# Patient Record
Sex: FEMALE | Race: ASIAN | Employment: UNEMPLOYED | URBAN - METROPOLITAN AREA
[De-identification: names, ages, dates, MRNs, and addresses within clinical notes are randomized per-mention and may not be internally consistent; named-entity substitution may affect disease eponyms.]

---

## 2018-05-10 ENCOUNTER — OFFICE VISIT (OUTPATIENT)
Dept: FAMILY MEDICINE CLINIC | Facility: CLINIC | Age: 28
End: 2018-05-10

## 2018-05-10 VITALS
OXYGEN SATURATION: 98 % | HEIGHT: 62.5 IN | RESPIRATION RATE: 18 BRPM | TEMPERATURE: 99 F | WEIGHT: 107 LBS | HEART RATE: 79 BPM | DIASTOLIC BLOOD PRESSURE: 64 MMHG | SYSTOLIC BLOOD PRESSURE: 90 MMHG | BODY MASS INDEX: 19.2 KG/M2

## 2018-05-10 DIAGNOSIS — N63.10 BREAST MASS, RIGHT: Primary | ICD-10-CM

## 2018-05-10 DIAGNOSIS — Z23 NEED FOR VACCINATION: ICD-10-CM

## 2018-05-10 PROCEDURE — 99203 OFFICE O/P NEW LOW 30 MIN: CPT | Performed by: FAMILY MEDICINE

## 2018-05-10 NOTE — PROGRESS NOTES
CHIEF COMPLAINT: Patient presents with:  Establish Care: lumps on right breast    HPI:     Angela Fung is a 28year old female presents for right upper outer quadrant breast mass large become larger over the past 3 years.   Diagnosed 3 years ago in atraumatic. Sclera clear and non icteric bilaterally. Oral pharynx clear without normal finding. Moist mucous membranes. Neck: supple. No adenopathy. No thyromegaly. Heart: RRR without S3 or S4 murmur .  Clear S1S2  Lungs: clear to auscultation bilate changing symptoms. Patient is to call with any side effects or complications from the treatments as a result of today. Problem List:   There is no problem list on file for this patient.       Imaging & Referrals:  None     5/10/2018  Kali Collins, DO

## 2018-05-10 NOTE — PATIENT INSTRUCTIONS
Breast Lump, Uncertain Cause    A lump was found in your breast. Most breast lumps are not cancer. They may be caused by normal changes in the breast tissue due to hormone variations that occur with your menstrual cycle.  Some women may form lumps that ar © 5186-1026 [employer] [address]. 140[address] [facility] [place], [address] [place]. All rights reserved. This information is not intended as a substitute for professional medical care. Always follow your healthcare professional's instructions.         What Ar These benign changes may cause a thickening and firmness in the breasts. Breasts may be tender or painful. They may feel more dense in some areas than in others. Sometimes solid or fluid-filled lumps (cysts) may also form.  Cysts may be smooth, soft or firm © 1215-9841 [employer] [address]. 1407 [facility] [place], [address] [place]. All rights reserved. This information is not intended as a substitute for professional medical care. Always follow your healthcare professional's instructions.         What Ar

## 2018-05-21 ENCOUNTER — HOSPITAL ENCOUNTER (OUTPATIENT)
Dept: MAMMOGRAPHY | Facility: HOSPITAL | Age: 28
Discharge: HOME | End: 2018-05-21
Attending: FAMILY MEDICINE
Payer: COMMERCIAL

## 2018-05-21 DIAGNOSIS — N63.10 BREAST MASS, RIGHT: ICD-10-CM

## 2018-05-21 PROCEDURE — 77062 BREAST TOMOSYNTHESIS BI: CPT | Performed by: FAMILY MEDICINE

## 2018-05-21 PROCEDURE — 76641 ULTRASOUND BREAST COMPLETE: CPT | Performed by: FAMILY MEDICINE

## 2018-05-21 PROCEDURE — 77066 DX MAMMO INCL CAD BI: CPT | Performed by: FAMILY MEDICINE

## 2018-05-21 NOTE — IMAGING NOTE
Assisted Dr. Prosper Mendoza with Recommendation for a 2 site Ultrasound guided breast biopsy. With  present, procedure explained and written information given to [facility]. Emotional support provided and all questions answered.   Our breast cent

## 2018-06-01 ENCOUNTER — HOSPITAL ENCOUNTER (OUTPATIENT)
Dept: MAMMOGRAPHY | Facility: HOSPITAL | Age: 28
Discharge: HOME | End: 2018-06-01
Attending: FAMILY MEDICINE
Payer: COMMERCIAL

## 2018-06-01 DIAGNOSIS — N63.0 BREAST MASS: ICD-10-CM

## 2018-06-01 PROCEDURE — 19084 BX BREAST ADD LESION US IMAG: CPT | Performed by: FAMILY MEDICINE

## 2018-06-01 PROCEDURE — 19083 BX BREAST 1ST LESION US IMAG: CPT | Performed by: FAMILY MEDICINE

## 2018-06-01 PROCEDURE — 88305 TISSUE EXAM BY PATHOLOGIST: CPT | Performed by: FAMILY MEDICINE

## 2018-06-05 ENCOUNTER — TELEPHONE (OUTPATIENT)
Dept: MAMMOGRAPHY | Facility: HOSPITAL | Age: 28
End: 2018-06-05

## 2018-06-06 ENCOUNTER — TELEPHONE (OUTPATIENT)
Dept: MAMMOGRAPHY | Facility: HOSPITAL | Age: 28
End: 2018-06-06

## 2018-06-06 NOTE — TELEPHONE ENCOUNTER
Telephoned Soren Mayly and name,  verified with pt's , Muna Rodriguez. Notified Soren Nicely via spouse of benign right breast US biopsy results.  However, pathologist is recommending site at 11:00 o'clock be removed due to juvenile fibroad

## 2018-06-12 ENCOUNTER — TELEPHONE (OUTPATIENT)
Dept: SURGERY | Facility: CLINIC | Age: 28
End: 2018-06-12

## 2018-06-12 ENCOUNTER — OFFICE VISIT (OUTPATIENT)
Dept: SURGERY | Facility: CLINIC | Age: 28
End: 2018-06-12

## 2018-06-12 VITALS
SYSTOLIC BLOOD PRESSURE: 109 MMHG | HEART RATE: 76 BPM | DIASTOLIC BLOOD PRESSURE: 73 MMHG | OXYGEN SATURATION: 100 % | WEIGHT: 106 LBS | TEMPERATURE: 99 F | HEIGHT: 62.5 IN | BODY MASS INDEX: 19.02 KG/M2

## 2018-06-12 DIAGNOSIS — D24.1 BREAST FIBROADENOMA IN FEMALE, RIGHT: Primary | ICD-10-CM

## 2018-06-12 PROCEDURE — 99244 OFF/OP CNSLTJ NEW/EST MOD 40: CPT | Performed by: SURGERY

## 2018-06-12 NOTE — TELEPHONE ENCOUNTER
S/w Jamil to schedule surgery. Offered several dates. He will call back after discussing dates with patient.  Provided direct call back number

## 2018-06-12 NOTE — PROGRESS NOTES
Breast Surgery New Patient Consultation    This is the first visit for this 28year old woman, referred by Dr. Gopal Dove, who presents for evaluation of right breast masses.     History of Present Illness:   Ms. Jaime Ku is a 28year old woman who outpatient prescriptions have been marked as taking for the 6/12/18 encounter (Office Visit) with Anupam Bush MD.    Allergies:    No Known Allergies    Family History:   Family History   Problem Relation Age of Onset   • No Known Problems Mother pain or vomiting blood.      Genitourinary:  The patient denies frequent urination, needing to get up at night to urinate, urinary hesitancy or retaining urine, painful urination, urinary incontinence, decreased urine stream, blood in the urine or vaginal/p symmetrically. The lungs are clear to auscultation. Heart: The rhythm is regular. There are no murmurs, rubs, gallops or thrills.     Breasts:  Her breasts are symmetrical.  Right breast: The skin, nipple ,and areola appear normal. There is no skin dimp However, fibroadenomas can grown with time and become symptomatic which can prompt surgical excision. Growth of the lesion over time warrants excision to distinguish the lesion from a proliferative Phyllodes tumor.  In light of the fact that the lesions are

## 2018-06-15 ENCOUNTER — OFFICE VISIT (OUTPATIENT)
Dept: FAMILY MEDICINE CLINIC | Facility: CLINIC | Age: 28
End: 2018-06-15

## 2018-06-15 VITALS
HEIGHT: 62.25 IN | HEART RATE: 78 BPM | TEMPERATURE: 98 F | SYSTOLIC BLOOD PRESSURE: 108 MMHG | RESPIRATION RATE: 16 BRPM | BODY MASS INDEX: 19.22 KG/M2 | DIASTOLIC BLOOD PRESSURE: 74 MMHG | OXYGEN SATURATION: 100 % | WEIGHT: 105.81 LBS

## 2018-06-15 DIAGNOSIS — Z13.29 SCREENING FOR ENDOCRINE, NUTRITIONAL, METABOLIC AND IMMUNITY DISORDER: ICD-10-CM

## 2018-06-15 DIAGNOSIS — Z13.0 SCREENING FOR IRON DEFICIENCY ANEMIA: ICD-10-CM

## 2018-06-15 DIAGNOSIS — Z13.0 SCREENING FOR ENDOCRINE, NUTRITIONAL, METABOLIC AND IMMUNITY DISORDER: ICD-10-CM

## 2018-06-15 DIAGNOSIS — Z13.6 SCREENING FOR ISCHEMIC HEART DISEASE: ICD-10-CM

## 2018-06-15 DIAGNOSIS — Z13.228 SCREENING FOR ENDOCRINE, NUTRITIONAL, METABOLIC AND IMMUNITY DISORDER: ICD-10-CM

## 2018-06-15 DIAGNOSIS — Z23 NEED FOR VACCINATION: ICD-10-CM

## 2018-06-15 DIAGNOSIS — Z12.4 CERVICAL CANCER SCREENING: ICD-10-CM

## 2018-06-15 DIAGNOSIS — Z00.00 ANNUAL PHYSICAL EXAM: Primary | ICD-10-CM

## 2018-06-15 DIAGNOSIS — Z13.21 SCREENING FOR ENDOCRINE, NUTRITIONAL, METABOLIC AND IMMUNITY DISORDER: ICD-10-CM

## 2018-06-15 PROCEDURE — 88175 CYTOPATH C/V AUTO FLUID REDO: CPT | Performed by: FAMILY MEDICINE

## 2018-06-15 PROCEDURE — 36415 COLL VENOUS BLD VENIPUNCTURE: CPT | Performed by: FAMILY MEDICINE

## 2018-06-15 PROCEDURE — 90715 TDAP VACCINE 7 YRS/> IM: CPT | Performed by: FAMILY MEDICINE

## 2018-06-15 PROCEDURE — 99395 PREV VISIT EST AGE 18-39: CPT | Performed by: FAMILY MEDICINE

## 2018-06-15 PROCEDURE — 80061 LIPID PANEL: CPT | Performed by: FAMILY MEDICINE

## 2018-06-15 PROCEDURE — 90471 IMMUNIZATION ADMIN: CPT | Performed by: FAMILY MEDICINE

## 2018-06-15 PROCEDURE — 80050 GENERAL HEALTH PANEL: CPT | Performed by: FAMILY MEDICINE

## 2018-06-15 NOTE — PROGRESS NOTES
Patient came in for fasting labs. Patient first draw attempt at right [facility] unsuccessful. Second attempt at left [facility]. Green and lavender tube drawn.

## 2018-06-15 NOTE — PATIENT INSTRUCTIONS
Perform labs fasting 8 hours with water or black coffee or or black tea diet  soda only prior to exam.    -Encourage healthy diet of whole food and avoid processed food and sugary drinks and sodas.   Diet should include lean meats and vegetables including 5 Nonfat milk   302 mg/1 cup   [place], sockeye, canned, with bones   239 mg/3 oz.   Tofu made with calcium sulfate   204 mg/3 oz.    Low-fat milk   297 mg/1 cup   Soybeans, fresh, boiled   131 mg/1/2 cup   Collards   179 mg/1/2 cup   Swiss cheese   272 mg/1 o · Certain medicines, such as cortisone, increase bone loss. They also decrease bone growth. Ask your healthcare provider about any side effects of your medicines, and how to prevent them.   · Protein-rich or salty foods eaten in large amounts may deplete ca Diabetes mellitus, type 2 Adults with no symptoms who are overweight or obese and have 1 or more other risk factors for diabetes At least every 3 years.  Also, testing for diabetes during pregnancy after the 24th week.    Gonorrhea Sexually active women at Measles, mumps, rubella (MMR) All women in this age group who have no record of these infections or vaccines 1 or 2 doses   Meningococcal Women at increased risk for infection should talk with their healthcare provider 1 or more doses   Pneumococcal conjug Date Last Reviewed: 10/1/2017  © 9939-7936 [employer] [address]. 1407 [facility] [place], [address] [place]. All rights reserved. This information is not intended as a substitute for professional medical care.  Always follow your healthcare professional · Weakness or falls    What other tests might I have along with this test?  A healthcare provider may also want to check your parathyroid hormone levels and your calcium levels.   What do my test results mean?   A result for a lab test may be affected by ma Tell your healthcare provider if you take vitamin D supplements. Also be sure your provider knows about all medicines, herbs, vitamins, and supplements you are taking.  This includes medicines that don't need a prescription and any illicit drugs you may use Exercise on most days. Aim for a total of 150 or more minutes of moderate to  vigorous intensity activity each week. Consider 40 minutes, 3 to 4 times a week. For best results, activity should last for 40 minutes on average.  It is OK to work up to the 36 m

## 2018-06-20 ENCOUNTER — TELEPHONE (OUTPATIENT)
Dept: FAMILY MEDICINE CLINIC | Facility: CLINIC | Age: 28
End: 2018-06-20

## 2018-06-20 NOTE — TELEPHONE ENCOUNTER
LMOM to return call to the office. Provided pt office phone [phone] along with office hours given.     Pt needs to schedule follow up    Notes recorded by Merle Frazier DO on 6/18/2018 at 10:55 PM CDT  Moderate anemia.  Needs OV to discuss results

## 2018-06-29 NOTE — TELEPHONE ENCOUNTER
Spoke with pt, scheduled appointment    Future Appointments  Date Time Provider Xena Damon   7/13/2018 1:00 PM Cj Redmond, DO [facility] [place]

## 2018-07-13 ENCOUNTER — OFFICE VISIT (OUTPATIENT)
Dept: FAMILY MEDICINE CLINIC | Facility: CLINIC | Age: 28
End: 2018-07-13

## 2018-07-13 VITALS
DIASTOLIC BLOOD PRESSURE: 70 MMHG | OXYGEN SATURATION: 97 % | BODY MASS INDEX: 19 KG/M2 | HEART RATE: 74 BPM | TEMPERATURE: 98 F | RESPIRATION RATE: 18 BRPM | WEIGHT: 107.25 LBS | SYSTOLIC BLOOD PRESSURE: 98 MMHG

## 2018-07-13 DIAGNOSIS — D50.8 IRON DEFICIENCY ANEMIA SECONDARY TO INADEQUATE DIETARY IRON INTAKE: Primary | ICD-10-CM

## 2018-07-13 PROCEDURE — 99213 OFFICE O/P EST LOW 20 MIN: CPT | Performed by: FAMILY MEDICINE

## 2018-07-13 RX ORDER — PNV NO.95/FERROUS FUM/FOLIC AC 28MG-0.8MG
1 TABLET ORAL 2 TIMES DAILY
Qty: 60 TABLET | Refills: 3 | Status: SHIPPED | OUTPATIENT
Start: 2018-07-13 | End: 2018-08-09

## 2018-07-13 RX ORDER — ASPIRIN 81 MG
100 TABLET, DELAYED RELEASE (ENTERIC COATED) ORAL 2 TIMES DAILY PRN
Qty: 60 TABLET | Refills: 0 | Status: SHIPPED | OUTPATIENT
Start: 2018-07-13 | End: 2018-08-09

## 2018-07-13 NOTE — PROGRESS NOTES
CHIEF COMPLAINT: Patient presents with: Follow - Up: lab results    HPI:     Anamika Lake is a 28year old female presents for discuss labs. History of anemia when pregnant 4 years ago. Took iron.   Denies any chest pain, dizziness, lightheadednes Pulse 74   Temp 98.2 °F (36.8 °C) (Oral)   Resp 18   Wt 107 lb 4 oz   LMP 07/05/2018 (Exact Date)   SpO2 97%   BMI 19.46 kg/m²   Vital signs reviewed. Appears stated age, well groomed. Physical Exam  General: Well-nourished, well hydrated.  No acute distres Neutrophil Absolute Prel* 06/15/2018 3.36    • Neutrophil Absolute 06/15/2018 3.36    • Lymphocyte Absolute 06/15/2018 1.59    • Monocyte Absolute 06/15/2018 0.33    • Eosinophil Absolute 06/15/2018 0.17    • Basophil Absolute 06/15/2018 0.06    • Immature First Screen:          Dee Eastman                                                     Specimen:     ThinPrep Imager Screening Pap, Endocervix                                                 Hospit sulfate 1 tablet daily for 2-3 weeks then twice daily. Increase fiber in diet drinks 64 ounces of water daily to help prevent constipation.   If intolerant of iron, consider referral for iron infusions.  - FERRITIN; Future-this week  - Ferrous Sulfate (IRO

## 2018-07-13 NOTE — PATIENT INSTRUCTIONS
· Start over-the-counter iron sulfate 325 mg 1 tablet by mouth ×3 weeks then increase to twice daily. With an orange only once a day at least 8 ounces of water to help absorption.   · If you experience constipation then buy over-the-counter Colace/docusa Follow up with your healthcare provider in 2 months, or as advised. This is to have another red blood cell count to be sure your anemia has been fixed.   When to seek medical advice  Call your healthcare provider right away if any of these occur:  · Sunshine

## 2018-07-20 ENCOUNTER — TELEPHONE (OUTPATIENT)
Dept: SURGERY | Facility: CLINIC | Age: 28
End: 2018-07-20

## 2018-07-20 NOTE — TELEPHONE ENCOUNTER
Patient's  calling back to schedule surgery. Offered several dates at both locations.  Patient chose 8/14 at [facility].

## 2018-08-06 ENCOUNTER — APPOINTMENT (OUTPATIENT)
Dept: LAB | Facility: HOSPITAL | Age: 28
End: 2018-08-06
Attending: FAMILY MEDICINE
Payer: COMMERCIAL

## 2018-08-06 DIAGNOSIS — D50.8 IRON DEFICIENCY ANEMIA SECONDARY TO INADEQUATE DIETARY IRON INTAKE: ICD-10-CM

## 2018-08-06 LAB
DEPRECATED HBV CORE AB SER IA-ACNC: 2.4 NG/ML (ref 12–114)
IRON SATURATION: 2 % (ref 15–50)
IRON: 13 UG/DL (ref 28–170)
TOTAL IRON BINDING CAPACITY: 618 UG/DL (ref 240–450)
TRANSFERRIN SERPL-MCNC: 415 MG/DL (ref 200–360)

## 2018-08-06 PROCEDURE — 82728 ASSAY OF FERRITIN: CPT

## 2018-08-06 PROCEDURE — 83540 ASSAY OF IRON: CPT

## 2018-08-06 PROCEDURE — 83550 IRON BINDING TEST: CPT

## 2018-08-06 PROCEDURE — 36415 COLL VENOUS BLD VENIPUNCTURE: CPT

## 2018-08-09 RX ORDER — ACETAMINOPHEN 500 MG
1000 TABLET ORAL ONCE
Status: CANCELLED | OUTPATIENT
Start: 2018-08-09 | End: 2018-08-09

## 2018-08-09 RX ORDER — HEPARIN SODIUM 5000 [USP'U]/ML
5000 INJECTION, SOLUTION INTRAVENOUS; SUBCUTANEOUS ONCE
Status: CANCELLED | OUTPATIENT
Start: 2018-08-09 | End: 2018-08-09

## 2018-08-10 ENCOUNTER — TELEPHONE (OUTPATIENT)
Dept: FAMILY MEDICINE CLINIC | Facility: CLINIC | Age: 28
End: 2018-08-10

## 2018-08-10 NOTE — TELEPHONE ENCOUNTER
LMOM to return call to the office. Provided pt office phone [phone] along with office hours given.     Notes recorded by Topher Wilkinson DO on 8/7/2018 at 9:09 PM CDT  Iron deficiency anemia.  Patient to continue iron supplementation. Rut Stewart is not anupam

## 2018-08-13 NOTE — TELEPHONE ENCOUNTER
Spoke with pt, reviewed results and recommendations, pt verbalized understanding, she has upcoming appointment with Dr Gonzalo Benites

## 2018-08-14 ENCOUNTER — HOSPITAL ENCOUNTER (OUTPATIENT)
Facility: HOSPITAL | Age: 28
Setting detail: HOSPITAL OUTPATIENT SURGERY
Discharge: HOME | End: 2018-08-14
Attending: SURGERY | Admitting: SURGERY
Payer: COMMERCIAL

## 2018-08-14 ENCOUNTER — SURGERY (OUTPATIENT)
Age: 28
End: 2018-08-14

## 2018-08-14 ENCOUNTER — ANESTHESIA EVENT (OUTPATIENT)
Dept: SURGERY | Facility: HOSPITAL | Age: 28
End: 2018-08-14
Payer: COMMERCIAL

## 2018-08-14 ENCOUNTER — ANESTHESIA (OUTPATIENT)
Dept: SURGERY | Facility: HOSPITAL | Age: 28
End: 2018-08-14
Payer: COMMERCIAL

## 2018-08-14 VITALS
DIASTOLIC BLOOD PRESSURE: 67 MMHG | SYSTOLIC BLOOD PRESSURE: 107 MMHG | WEIGHT: 105.81 LBS | HEART RATE: 72 BPM | RESPIRATION RATE: 18 BRPM | BODY MASS INDEX: 19.22 KG/M2 | HEIGHT: 62.25 IN | OXYGEN SATURATION: 100 % | TEMPERATURE: 98 F

## 2018-08-14 DIAGNOSIS — N63.10 BREAST MASS, RIGHT: ICD-10-CM

## 2018-08-14 LAB
EXPIRATION DATE: NORMAL
POCT LOT NUMBER: NORMAL
POCT URINE PREGNANCY: NEGATIVE

## 2018-08-14 PROCEDURE — 0JB60ZZ EXCISION OF CHEST SUBCUTANEOUS TISSUE AND FASCIA, OPEN APPROACH: ICD-10-PCS | Performed by: SURGERY

## 2018-08-14 PROCEDURE — 81025 URINE PREGNANCY TEST: CPT | Performed by: SURGERY

## 2018-08-14 PROCEDURE — 88305 TISSUE EXAM BY PATHOLOGIST: CPT | Performed by: SURGERY

## 2018-08-14 RX ORDER — MORPHINE SULFATE 4 MG/ML
2 INJECTION, SOLUTION INTRAMUSCULAR; INTRAVENOUS EVERY 5 MIN PRN
Status: DISCONTINUED | OUTPATIENT
Start: 2018-08-14 | End: 2018-08-14

## 2018-08-14 RX ORDER — BUPIVACAINE HYDROCHLORIDE 5 MG/ML
INJECTION, SOLUTION EPIDURAL; INTRACAUDAL AS NEEDED
Status: DISCONTINUED | OUTPATIENT
Start: 2018-08-14 | End: 2018-08-14 | Stop reason: HOSPADM

## 2018-08-14 RX ORDER — SODIUM CHLORIDE, SODIUM LACTATE, POTASSIUM CHLORIDE, CALCIUM CHLORIDE 600; 310; 30; 20 MG/100ML; MG/100ML; MG/100ML; MG/100ML
INJECTION, SOLUTION INTRAVENOUS CONTINUOUS
Status: DISCONTINUED | OUTPATIENT
Start: 2018-08-14 | End: 2018-08-14

## 2018-08-14 RX ORDER — CEFAZOLIN SODIUM/WATER 2 G/20 ML
2 SYRINGE (ML) INTRAVENOUS ONCE
Status: DISCONTINUED | OUTPATIENT
Start: 2018-08-14 | End: 2018-08-14 | Stop reason: HOSPADM

## 2018-08-14 RX ORDER — ACETAMINOPHEN 500 MG
500 TABLET ORAL ONCE
COMMUNITY
End: 2018-08-21 | Stop reason: ALTCHOICE

## 2018-08-14 RX ORDER — DEXAMETHASONE SODIUM PHOSPHATE 4 MG/ML
4 VIAL (ML) INJECTION AS NEEDED
Status: DISCONTINUED | OUTPATIENT
Start: 2018-08-14 | End: 2018-08-14

## 2018-08-14 RX ORDER — LIDOCAINE HYDROCHLORIDE AND EPINEPHRINE 10; 10 MG/ML; UG/ML
INJECTION, SOLUTION INFILTRATION; PERINEURAL AS NEEDED
Status: DISCONTINUED | OUTPATIENT
Start: 2018-08-14 | End: 2018-08-14 | Stop reason: HOSPADM

## 2018-08-14 RX ORDER — HYDROCODONE BITARTRATE AND ACETAMINOPHEN 5; 325 MG/1; MG/1
2 TABLET ORAL AS NEEDED
Status: DISCONTINUED | OUTPATIENT
Start: 2018-08-14 | End: 2018-08-14

## 2018-08-14 RX ORDER — HYDROCODONE BITARTRATE AND ACETAMINOPHEN 5; 325 MG/1; MG/1
1 TABLET ORAL AS NEEDED
Status: DISCONTINUED | OUTPATIENT
Start: 2018-08-14 | End: 2018-08-14

## 2018-08-14 RX ORDER — HYDROCODONE BITARTRATE AND ACETAMINOPHEN 5; 325 MG/1; MG/1
1-2 TABLET ORAL EVERY 6 HOURS PRN
Qty: 20 TABLET | Refills: 0 | Status: SHIPPED | OUTPATIENT
Start: 2018-08-14 | End: 2018-08-21 | Stop reason: ALTCHOICE

## 2018-08-14 RX ORDER — ONDANSETRON 2 MG/ML
4 INJECTION INTRAMUSCULAR; INTRAVENOUS AS NEEDED
Status: DISCONTINUED | OUTPATIENT
Start: 2018-08-14 | End: 2018-08-14

## 2018-08-14 RX ORDER — NALOXONE HYDROCHLORIDE 0.4 MG/ML
80 INJECTION, SOLUTION INTRAMUSCULAR; INTRAVENOUS; SUBCUTANEOUS AS NEEDED
Status: DISCONTINUED | OUTPATIENT
Start: 2018-08-14 | End: 2018-08-14

## 2018-08-14 NOTE — H&P
History of Present Illness:   Ms. Brenda Hill is a 28year old woman who presents with self detected right breast mass.   The patient reports a remote history of a right breast excisional biopsy that was done in [place] and 2003 and was reportedly jing Allergies     Family History:         Family History   Problem Relation Age of Onset   • No Known Problems Mother     • No Known Problems Father     • Diabetes Maternal Grandmother     • No Known Problems Maternal Grandfather     • No Known Problems Patern urinate, urinary hesitancy or retaining urine, painful urination, urinary incontinence, decreased urine stream, blood in the urine or vaginal/penile discharge.     Skin:    The patient denies rash, itching, skin lesions, dry skin, change in skin color or c murmurs, rubs, gallops or thrills.     Breasts:  Her breasts are symmetrical.  Right breast: The skin, nipple ,and areola appear normal. There is no skin dimpling with movement of the pectoralis. There is no nipple retraction.  No nipple discharge can be el excision. Growth of the lesion over time warrants excision to distinguish the lesion from a proliferative Phyllodes tumor. In light of the fact that the lesions are greater than 2 cm and symptomatic in the right breast, surgical excision is recommended.   I

## 2018-08-14 NOTE — ANESTHESIA PREPROCEDURE EVALUATION
PRE-OP EVALUATION    Patient Name: Gay Young    Pre-op Diagnosis: Breast mass, right [N63.10]    Procedure(s):  Right breast excisional biopsy at two sites    Surgeon(s) and Role:     Cayla Dickson MD - Primary    Pre-op vitals reviewed. CREATSERUM 0.60 06/15/2018   GLU 85 06/15/2018   CA 9.0 06/15/2018            Airway      Mallampati: II  Mouth opening: >3 FB  TM distance: > 6 cm  Neck ROM: full Cardiovascular    Cardiovascular exam normal.         Dental    No notable dental history.

## 2018-08-14 NOTE — ANESTHESIA POSTPROCEDURE EVALUATION
DEBBY Laird [address] Patient Status:  Hospital Outpatient Surgery   Age/Gender 28year old female MRN [id number]   [facility] SURGERY Attending Pete Cadena MD   Hosp Day # 0 PCP Augustus Price DO       Anesthesia Post-

## 2018-08-14 NOTE — BRIEF OP NOTE
Pre-Operative Diagnosis: Breast mass, right [N63.10]     Post-Operative Diagnosis: Breast mass, right [N63.10]      Procedure Performed:   Procedure(s):  Right breast excisional biopsy at two sites    Surgeon(s) and Role:     Mony Berg MD - Tremaine French

## 2018-08-15 NOTE — OPERATIVE REPORT
[facility]    PATIENT'S NAME: Chace Graff   ATTENDING PHYSICIAN: Therese Eugene M.D. OPERATING PHYSICIAN: Therese Broderick. Regina Eugene M.D.    PATIENT ACCOUNT#:   [de-identified]    LOCATION:  [address] 10  MEDICAL RECORD #:   [facility], VS proposed surgery. PROCEDURE:  Patient was brought to the operating room and placed in the supine position. She was properly padded and secured.   She was given a dose of IV antibiotics, and sequential compression devices were applied to her legs for DVT subcuticular Monocryl for the skin. Mastisol and Steri-Strips were applied. Marcaine 0.5% was instilled into the cavity to assist with postoperative analgesia. A sterile dressing was applied.   Blood loss was minimal.  All counts were correct at the conc

## 2018-08-21 ENCOUNTER — OFFICE VISIT (OUTPATIENT)
Dept: SURGERY | Facility: CLINIC | Age: 28
End: 2018-08-21
Payer: COMMERCIAL

## 2018-08-21 VITALS
HEIGHT: 62.25 IN | HEART RATE: 75 BPM | SYSTOLIC BLOOD PRESSURE: 110 MMHG | RESPIRATION RATE: 20 BRPM | DIASTOLIC BLOOD PRESSURE: 74 MMHG | BODY MASS INDEX: 19.08 KG/M2 | OXYGEN SATURATION: 100 % | WEIGHT: 105 LBS

## 2018-08-21 DIAGNOSIS — N63.20 LEFT BREAST MASS: ICD-10-CM

## 2018-08-21 DIAGNOSIS — D24.1 BREAST FIBROADENOMA IN FEMALE, RIGHT: Primary | ICD-10-CM

## 2018-08-21 PROCEDURE — 99024 POSTOP FOLLOW-UP VISIT: CPT | Performed by: SURGERY

## 2018-08-21 NOTE — PROGRESS NOTES
Breast Surgery Post-Operative Visit    Diagnosis: Status post excision of right breast fibroadenomas ×2 on August 14, 2018. Stage: N/A    Disease Status:  Surgical treatment complete, no further treatment pending. History:    This 28year old woman pr achieved menarche at age 14 and LMP     She denies any history of oral contraceptive use. She denies infertility treatment to achieve pregnancy.     Medications:      No outpatient prescriptions have been marked as taking for the 8/21/18 encounter (Office with swallowing, reflux symptoms, vomiting, dark or bloody stools, constipation, yellowing of the skin, indigestion, nausea, change in bowel habits, diarrhea, abdominal pain or vomiting blood.      Genitourinary:  The patient denies frequent urination, need fibroadenomas with imaging detected left breast masses. Discussion and Plan:  I had a discussion with the Patient regarding her breast exam. On exam today I found her to be healing well since recent surgery with no signs of infection.   I personally alexa

## 2018-08-23 ENCOUNTER — OFFICE VISIT (OUTPATIENT)
Dept: HEMATOLOGY/ONCOLOGY | Facility: HOSPITAL | Age: 28
End: 2018-08-23
Attending: INTERNAL MEDICINE
Payer: COMMERCIAL

## 2018-08-23 VITALS
HEIGHT: 62.24 IN | OXYGEN SATURATION: 100 % | BODY MASS INDEX: 19.84 KG/M2 | DIASTOLIC BLOOD PRESSURE: 79 MMHG | WEIGHT: 109.19 LBS | HEART RATE: 79 BPM | RESPIRATION RATE: 18 BRPM | SYSTOLIC BLOOD PRESSURE: 126 MMHG | TEMPERATURE: 98 F

## 2018-08-23 DIAGNOSIS — D50.0 IRON DEFICIENCY ANEMIA SECONDARY TO BLOOD LOSS (CHRONIC): Primary | ICD-10-CM

## 2018-08-23 DIAGNOSIS — T45.4X5D IRON ADVERSE REACTION, SUBSEQUENT ENCOUNTER: ICD-10-CM

## 2018-08-23 LAB
BASOPHILS # BLD AUTO: 0.06 X10(3) UL (ref 0–0.1)
BASOPHILS NFR BLD AUTO: 0.8 %
EOSINOPHIL # BLD AUTO: 0.17 X10(3) UL (ref 0–0.3)
EOSINOPHIL NFR BLD AUTO: 2.2 %
ERYTHROCYTE [DISTWIDTH] IN BLOOD BY AUTOMATED COUNT: 16.6 % (ref 11.5–16)
HCT VFR BLD AUTO: 28.2 % (ref 34–50)
HGB BLD-MCNC: 8.5 G/DL (ref 12–16)
IMMATURE GRANULOCYTE COUNT: 0.02 X10(3) UL (ref 0–1)
IMMATURE GRANULOCYTE RATIO %: 0.3 %
LYMPHOCYTES # BLD AUTO: 2.36 X10(3) UL (ref 0.9–4)
LYMPHOCYTES NFR BLD AUTO: 29.9 %
MCH RBC QN AUTO: 19.1 PG (ref 27–33.2)
MCHC RBC AUTO-ENTMCNC: 30.1 G/DL (ref 31–37)
MCV RBC AUTO: 63.4 FL (ref 81–100)
MONOCYTES # BLD AUTO: 0.49 X10(3) UL (ref 0.1–1)
MONOCYTES NFR BLD AUTO: 6.2 %
NEUTROPHIL ABS PRELIM: 4.79 X10 (3) UL (ref 1.3–6.7)
NEUTROPHILS # BLD AUTO: 4.79 X10(3) UL (ref 1.3–6.7)
NEUTROPHILS NFR BLD AUTO: 60.6 %
PLATELET # BLD AUTO: 259 10(3)UL (ref 150–450)
RBC # BLD AUTO: 4.45 X10(6)UL (ref 3.8–5.1)
RED CELL DISTRIBUTION WIDTH-SD: 37.2 FL (ref 35.1–46.3)
RETIC ABS CALC AUTO: 63.6 X10(3) UL (ref 22.5–147.5)
RETIC IRF CALC: 0.14 RATIO (ref 0.09–0.3)
RETIC%: 1.4 % (ref 0.5–2.5)
RETICULOCYTE HEMOGLOBIN EQUIVALENT: 21.3 PG (ref 28.2–36.3)
WBC # BLD AUTO: 7.9 X10(3) UL (ref 4–13)

## 2018-08-23 PROCEDURE — 96376 TX/PRO/DX INJ SAME DRUG ADON: CPT

## 2018-08-23 PROCEDURE — 96374 THER/PROPH/DIAG INJ IV PUSH: CPT

## 2018-08-23 PROCEDURE — 85025 COMPLETE CBC W/AUTO DIFF WBC: CPT

## 2018-08-23 PROCEDURE — 96365 THER/PROPH/DIAG IV INF INIT: CPT

## 2018-08-23 PROCEDURE — 99245 OFF/OP CONSLTJ NEW/EST HI 55: CPT | Performed by: INTERNAL MEDICINE

## 2018-08-23 PROCEDURE — 85045 AUTOMATED RETICULOCYTE COUNT: CPT

## 2018-08-23 NOTE — PROGRESS NOTES
Hematology Initial Consultation Note    Patient Name: Kajal Rose  Medical Record Number: [id number]    YOB: 1990   Date of Consultation: 8/23/2018   PCP: Dr. Melanie Poole     Reason for Consultation:  Kajal Rose was seen to ONLY  '08: AMIRAH NEEDLE LOCALIZATION W/ SPECIMEN 1 SITE RIG*      Comment: BENIGN    Home Medications:    No outpatient prescriptions have been marked as taking for the 8/23/18 encounter (Office Visit) with Mirza Rodriguez MD.  NONE    Allergies:   No Kno Oropharynx is clear  CV: Regular rate and rhythm, no murmurs, no LE edema  Respiratory: Lungs clear to auscultation bilaterally  GI/Abd: Soft, non-tender with normoactive bowel sounds, no hepatosplenomegaly  Neurological: Grossly intact   Lymphatics: No pa

## 2018-08-23 NOTE — PROGRESS NOTES
Pt here for consult for LUCIAN referred by Dr. Eva Huang. Pt reports energy levels are fine and no cravings of ice or pica.  Pt on for INFED after MD.

## 2018-08-23 NOTE — PROGRESS NOTES
Education Record    Learner:  Patient    Disease / Diagnosis: Iron deficiency anemia    Barriers / Limitations:  None   Comments:    Method:  Brief focused, Printed material and Reinforcement   Comments:    General Topics:  Plan of care reviewed   Comments

## 2018-08-23 NOTE — PATIENT INSTRUCTIONS
May call [facility] at [phone], follow the prompt to Medical Oncology to schedule your appointments. Return to clinic in 1 month for lab draw and follow up with Dr. Ward Gustafson. Iron Dextran injection  Brand Name: Primus Push  What is this medicine? also interact with the following medications:  · chloramphenicol  · deferasirox  What if I miss a dose? It is important not to miss your dose. Call your doctor or health care professional if you are unable to keep an appointment.   Where should I keep my m

## 2018-10-12 ENCOUNTER — OFFICE VISIT (OUTPATIENT)
Dept: FAMILY MEDICINE CLINIC | Facility: CLINIC | Age: 28
End: 2018-10-12
Payer: COMMERCIAL

## 2018-10-12 VITALS
OXYGEN SATURATION: 98 % | TEMPERATURE: 98 F | WEIGHT: 111.38 LBS | BODY MASS INDEX: 20 KG/M2 | RESPIRATION RATE: 18 BRPM | DIASTOLIC BLOOD PRESSURE: 60 MMHG | SYSTOLIC BLOOD PRESSURE: 82 MMHG | HEART RATE: 73 BPM

## 2018-10-12 DIAGNOSIS — D50.8 IRON DEFICIENCY ANEMIA SECONDARY TO INADEQUATE DIETARY IRON INTAKE: ICD-10-CM

## 2018-10-12 DIAGNOSIS — D50.0 IRON DEFICIENCY ANEMIA SECONDARY TO BLOOD LOSS (CHRONIC): ICD-10-CM

## 2018-10-12 DIAGNOSIS — Z23 NEED FOR VACCINATION: ICD-10-CM

## 2018-10-12 DIAGNOSIS — D50.9 MICROCYTIC ANEMIA: Primary | ICD-10-CM

## 2018-10-12 PROBLEM — N63.10 BREAST MASS, RIGHT: Status: RESOLVED | Noted: 2018-05-10 | Resolved: 2018-10-12

## 2018-10-12 PROCEDURE — 90471 IMMUNIZATION ADMIN: CPT | Performed by: FAMILY MEDICINE

## 2018-10-12 PROCEDURE — 85045 AUTOMATED RETICULOCYTE COUNT: CPT | Performed by: FAMILY MEDICINE

## 2018-10-12 PROCEDURE — 85025 COMPLETE CBC W/AUTO DIFF WBC: CPT | Performed by: FAMILY MEDICINE

## 2018-10-12 PROCEDURE — 90686 IIV4 VACC NO PRSV 0.5 ML IM: CPT | Performed by: FAMILY MEDICINE

## 2018-10-12 PROCEDURE — 36415 COLL VENOUS BLD VENIPUNCTURE: CPT | Performed by: FAMILY MEDICINE

## 2018-10-12 PROCEDURE — 82728 ASSAY OF FERRITIN: CPT | Performed by: INTERNAL MEDICINE

## 2018-10-12 PROCEDURE — 99213 OFFICE O/P EST LOW 20 MIN: CPT | Performed by: FAMILY MEDICINE

## 2018-10-12 NOTE — PROGRESS NOTES
CHIEF COMPLAINT: Patient presents with: Follow - Up: lab results; Flu-Y    HPI:     Angie Ca is a 28year old female presents for discuss labs. Has not completed repeat CBC and ferritin. Referred to hematology and had iron infusion 8/23/2018. dizziess     Pertinent positives and negatives noted in the the HPI.     PHYSICAL EXAM:   BP (!) 82/60 (BP Location: Right arm, Patient Position: Sitting, Cuff Size: adult)   Pulse 73   Temp 98.1 °F (36.7 °C) (Oral)   Resp 18   Wt 111 lb 6.4 oz   LMP 09/27/ 08/23/2018 2.2    • Basophil % 08/23/2018 0.8    • Immature Granulocyte % 08/23/2018 0.3    Admission on 08/14/2018, Discharged on 08/14/2018   Component Date Value   • POCT urine pregnancy 08/14/2018 Negative    • POCT LOT NUMBER 08/14/2018 [id number]    • Visit:  Requested Prescriptions      No prescriptions requested or ordered in this encounter       Health Maintenance:  Influenza Vaccine(1) due on 09/01/2018  Annual Depression Screen due on 06/15/2019  Annual Physical due on 06/15/2019  Pap Smear,3 Years

## 2018-10-12 NOTE — PATIENT INSTRUCTIONS
Please follow up with   HEMATOLOGY/ONCOLOGY  [facility] Hematology- Office: [phone]  ? Dr. Roby Muñoz  Iron-Deficiency Anemia (Adult)  Red blood cells carry oxygen to the tissues of your body.  Anemia is a condition in which you have too few red bloo · Dizziness or fainting  · Vomiting blood or passing red or black-colored stool   Date Last Reviewed: 3/1/2018  © 8487-6425 [employer] [address]. 1407 [facility] [place], [address] [place] [address]. All rights reserved.  This information is not intended as a sub

## 2018-10-12 NOTE — PROGRESS NOTES
Patient came in for non fasting labs. Patient drawn out of right AC x 1 attempt. Green and lavender tube drawn.

## 2018-10-15 ENCOUNTER — TELEPHONE (OUTPATIENT)
Dept: FAMILY MEDICINE CLINIC | Facility: CLINIC | Age: 28
End: 2018-10-15

## 2018-10-15 NOTE — TELEPHONE ENCOUNTER
Informed pt pf results and recommendations. PT verbalized understanding. Pt states she will follow up with Dr. Tami Borges.  ----- Message from Jaya Miranda DO sent at 10/12/2018  5:02 PM CDT -----  Seen hematology for iron infusion x1 on 8/23/2018.   Patient

## 2018-10-25 ENCOUNTER — TELEPHONE (OUTPATIENT)
Dept: HEMATOLOGY/ONCOLOGY | Facility: HOSPITAL | Age: 28
End: 2018-10-25

## 2020-05-16 ENCOUNTER — PATIENT MESSAGE (OUTPATIENT)
Dept: FAMILY MEDICINE CLINIC | Facility: CLINIC | Age: 30
End: 2020-05-16

## 2020-05-18 ENCOUNTER — VIRTUAL PHONE E/M (OUTPATIENT)
Dept: FAMILY MEDICINE CLINIC | Facility: CLINIC | Age: 30
End: 2020-05-18
Payer: COMMERCIAL

## 2020-05-18 DIAGNOSIS — L24.89 IRRITANT CONTACT DERMATITIS DUE TO OTHER AGENTS: Primary | ICD-10-CM

## 2020-05-18 PROCEDURE — 99213 OFFICE O/P EST LOW 20 MIN: CPT | Performed by: FAMILY MEDICINE

## 2020-05-18 NOTE — PATIENT INSTRUCTIONS
Cerave moisturizing cream in a jar- apply 2 times daily. No soap on skin unless soiled or dirty. Understanding Contact Dermatitis    Contact dermatitis is a common type of skin rash.  It’s caused by something that touches the skin and makes it irritated a clean damp cloth. Put it on the area for 20 to 30 minutes, 5 to 6 times a day for the first 3 days. · Steroid cream or ointment. You can apply this medicine several times a day on clean skin. · Oral corticosteroid.  Your healthcare provider may prescribe professional medical care. Always follow your healthcare professional's instructions.

## 2020-05-18 NOTE — TELEPHONE ENCOUNTER
From: Shahnaz Espinoza  To: Taty Bunn DO  Sent: 5/16/2020 12:15 PM CDT  Subject: Non-Urgent Medical Question    Hi Dr. Pinky Davis, I hope you are doing well.  There are some rashes started to appear on my neck, abdomen and back which I find quite unus

## 2020-05-18 NOTE — PROGRESS NOTES
Due to COVID-19 ACTION PLAN, the patient's office visit was converted to a phone or video visit.   Time Spent: 16 mins    Subjective     HPI:   Tiago Neves is a 26year old female who presents for rash started 5 days ago on anterior neck, abdomen, b Rx triamcinolone to affected areas until rash clears maximum 1 week. If rash clears sooner than should discontinue.   Discussed risk of fluorinated steroids and permanent damage to the skin, striation, hypopigmentation etc.     Recommended OTC medications-

## 2020-06-21 ENCOUNTER — APPOINTMENT (OUTPATIENT)
Dept: GENERAL RADIOLOGY | Facility: HOSPITAL | Age: 30
End: 2020-06-21
Attending: EMERGENCY MEDICINE
Payer: COMMERCIAL

## 2020-06-21 ENCOUNTER — HOSPITAL ENCOUNTER (EMERGENCY)
Facility: HOSPITAL | Age: 30
Discharge: HOME | End: 2020-06-21
Attending: EMERGENCY MEDICINE
Payer: COMMERCIAL

## 2020-06-21 VITALS
RESPIRATION RATE: 18 BRPM | BODY MASS INDEX: 17.49 KG/M2 | SYSTOLIC BLOOD PRESSURE: 102 MMHG | HEIGHT: 65 IN | WEIGHT: 105 LBS | HEART RATE: 71 BPM | OXYGEN SATURATION: 99 % | DIASTOLIC BLOOD PRESSURE: 77 MMHG | TEMPERATURE: 99 F

## 2020-06-21 DIAGNOSIS — R07.9 ACUTE CHEST PAIN: Primary | ICD-10-CM

## 2020-06-21 DIAGNOSIS — K21.9 GASTROESOPHAGEAL REFLUX DISEASE, ESOPHAGITIS PRESENCE NOT SPECIFIED: ICD-10-CM

## 2020-06-21 PROCEDURE — 99285 EMERGENCY DEPT VISIT HI MDM: CPT

## 2020-06-21 PROCEDURE — 96375 TX/PRO/DX INJ NEW DRUG ADDON: CPT

## 2020-06-21 PROCEDURE — 84484 ASSAY OF TROPONIN QUANT: CPT | Performed by: EMERGENCY MEDICINE

## 2020-06-21 PROCEDURE — 85379 FIBRIN DEGRADATION QUANT: CPT | Performed by: EMERGENCY MEDICINE

## 2020-06-21 PROCEDURE — 80053 COMPREHEN METABOLIC PANEL: CPT | Performed by: EMERGENCY MEDICINE

## 2020-06-21 PROCEDURE — 96374 THER/PROPH/DIAG INJ IV PUSH: CPT

## 2020-06-21 PROCEDURE — 71046 X-RAY EXAM CHEST 2 VIEWS: CPT | Performed by: EMERGENCY MEDICINE

## 2020-06-21 PROCEDURE — 85025 COMPLETE CBC W/AUTO DIFF WBC: CPT | Performed by: EMERGENCY MEDICINE

## 2020-06-21 PROCEDURE — 93005 ELECTROCARDIOGRAM TRACING: CPT

## 2020-06-21 PROCEDURE — 93010 ELECTROCARDIOGRAM REPORT: CPT

## 2020-06-21 RX ORDER — PANTOPRAZOLE SODIUM 40 MG/1
40 TABLET, DELAYED RELEASE ORAL DAILY
Qty: 30 TABLET | Refills: 0 | Status: SHIPPED | OUTPATIENT
Start: 2020-06-21 | End: 2020-06-22

## 2020-06-21 RX ORDER — LIDOCAINE HYDROCHLORIDE 20 MG/ML
10 SOLUTION OROPHARYNGEAL ONCE
Status: COMPLETED | OUTPATIENT
Start: 2020-06-21 | End: 2020-06-21

## 2020-06-21 RX ORDER — MAGNESIUM HYDROXIDE/ALUMINUM HYDROXICE/SIMETHICONE 120; 1200; 1200 MG/30ML; MG/30ML; MG/30ML
30 SUSPENSION ORAL ONCE
Status: COMPLETED | OUTPATIENT
Start: 2020-06-21 | End: 2020-06-21

## 2020-06-21 RX ORDER — ONDANSETRON 4 MG/1
4 TABLET, ORALLY DISINTEGRATING ORAL EVERY 4 HOURS PRN
Qty: 10 TABLET | Refills: 0 | Status: SHIPPED | OUTPATIENT
Start: 2020-06-21 | End: 2020-06-28

## 2020-06-21 RX ORDER — ONDANSETRON 4 MG/1
4 TABLET, ORALLY DISINTEGRATING ORAL EVERY 4 HOURS PRN
Qty: 10 TABLET | Refills: 0 | Status: SHIPPED | OUTPATIENT
Start: 2020-06-21 | End: 2020-06-21 | Stop reason: CLARIF

## 2020-06-21 RX ORDER — KETOROLAC TROMETHAMINE 30 MG/ML
15 INJECTION, SOLUTION INTRAMUSCULAR; INTRAVENOUS ONCE
Status: COMPLETED | OUTPATIENT
Start: 2020-06-21 | End: 2020-06-21

## 2020-06-21 RX ORDER — PANTOPRAZOLE SODIUM 40 MG/1
40 TABLET, DELAYED RELEASE ORAL DAILY
Qty: 30 TABLET | Refills: 0 | Status: SHIPPED | OUTPATIENT
Start: 2020-06-21 | End: 2020-06-21 | Stop reason: CLARIF

## 2020-06-21 RX ORDER — ONDANSETRON 2 MG/ML
4 INJECTION INTRAMUSCULAR; INTRAVENOUS ONCE
Status: COMPLETED | OUTPATIENT
Start: 2020-06-21 | End: 2020-06-21

## 2020-06-21 NOTE — ED INITIAL ASSESSMENT (HPI)
Pt here from immediate care seen for SOB say two and chest pain that started yesterday. Pt nausea when she trys to eat.

## 2020-06-21 NOTE — ED PROVIDER NOTES
Patient Seen in: [facility] Emergency Department      History   Patient presents with:  Chest Pain Angina  Dyspnea ARIES SOB    Stated Complaint: SOB CHest pain    HPI    25-year-old female presents with pain to the midsternal region that radiates upw 06/21/20 1455 98.5 °F (36.9 °C)   Temp src 06/21/20 1345 Tympanic   SpO2 06/21/20 1345 98 %   O2 Device 06/21/20 1345 None (Room air)       Current:/77   Pulse 61   Temp 98.5 °F (36.9 °C) (Temporal)   Resp 17   Ht 165.1 cm (5' 5\")   Wt 47.6 kg   SpO individual orders. RAINBOW DRAW BLUE   RAINBOW DRAW LAVENDER   RAINBOW DRAW LIGHT GREEN   RAINBOW DRAW GOLD     EKG    Rate, intervals and axes as noted on EKG Report.   Rate: 63  Rhythm: Sinus Rhythm  Reading: No evidence of acute ischemia              X [id number]  [phone]    Call            Medications Prescribed:  Current Discharge Medication List    START taking these medications    Pantoprazole Sodium 40 MG Oral Tab EC  Take 1 tablet (40 mg total) by mouth daily.   Qty: 30 tablet Refills: 0    ondanset

## 2020-06-22 ENCOUNTER — OFFICE VISIT (OUTPATIENT)
Dept: FAMILY MEDICINE CLINIC | Facility: CLINIC | Age: 30
End: 2020-06-22
Payer: COMMERCIAL

## 2020-06-22 VITALS
HEIGHT: 65 IN | HEART RATE: 73 BPM | OXYGEN SATURATION: 98 % | TEMPERATURE: 98 F | BODY MASS INDEX: 17.3 KG/M2 | WEIGHT: 103.81 LBS | RESPIRATION RATE: 18 BRPM | DIASTOLIC BLOOD PRESSURE: 50 MMHG | SYSTOLIC BLOOD PRESSURE: 92 MMHG

## 2020-06-22 DIAGNOSIS — D50.0 IRON DEFICIENCY ANEMIA SECONDARY TO BLOOD LOSS (CHRONIC): ICD-10-CM

## 2020-06-22 DIAGNOSIS — M94.0 COSTOCHONDRITIS: ICD-10-CM

## 2020-06-22 DIAGNOSIS — R07.89 ATYPICAL CHEST PAIN: Primary | ICD-10-CM

## 2020-06-22 DIAGNOSIS — R12 HEARTBURN: ICD-10-CM

## 2020-06-22 PROCEDURE — 99214 OFFICE O/P EST MOD 30 MIN: CPT | Performed by: FAMILY MEDICINE

## 2020-06-22 RX ORDER — PANTOPRAZOLE SODIUM 40 MG/1
40 TABLET, DELAYED RELEASE ORAL DAILY
Qty: 90 TABLET | Refills: 0 | Status: SHIPPED | OUTPATIENT
Start: 2020-06-22 | End: 2020-07-22

## 2020-06-22 RX ORDER — NORELGESTROMIN AND ETHINYL ESTRADIOL 150; 35 UG/D; UG/D
PATCH TRANSDERMAL
COMMUNITY
Start: 2020-06-03 | End: 2021-04-13 | Stop reason: ALTCHOICE

## 2020-06-22 NOTE — PATIENT INSTRUCTIONS
As of October 6th 2014, the Drug Enforcement Agency [facility]) is reclassifying all hydrocodone combination medications from Schedule III to Schedule II. This includes medications such as Norco, Vicodin, Lortab, Zohydro, and Vicoprofen.      What this means for the esophagus. If you often have a painful burning feeling in your chest after you eat, you may have gastroesophageal reflux disease (GERD). Heartburn that keeps coming back is a classic symptom of GERD. But you may have other symptoms as well.  A GERD d provider if you don’t understand how to make the dietary changes needed to control your GERD symptoms. Your provider can refer you to a nutritionist.   Hira Sierra last reviewed this educational content on 6/1/2019  © 4115-0659 [employer] [address].  1467 [facility] lemon). · Don’t eat large meals, especially at night. Frequent, smaller meals are best. Don't lie down right after eating. And don’t eat anything 3 hours before going to bed. · Don't drink alcohol or smoke.  As much as possible, stay away from second hand or vomit (red or black in color)  · Feeling weak or dizzy  · Fever of 100.4ºF (38ºC) or higher, or as directed by your healthcare provider  Franco last reviewed this educational content on 3/1/2018  © 6264-8638 [employer] [address].  [address] [facility] true if you take medicine to control your GERD or not. Talk with your healthcare provider about the following suggestions. They may help you get relief from your symptoms. Raise your head  Reflux is more likely to happen when you’re lying down flat. which you have too few red blood cells. You need iron to make red cells. Anemia makes you feel tired and run down. When anemia becomes severe, your skin becomes pale. You may feel short of breath after physical activity.  Other symptoms include:  · Headache All rights reserved. This information is not intended as a substitute for professional medical care. Always follow your healthcare professional's instructions.         Chest Wall Pain: Costochondritis    The chest pain that you have had today is caused by c first few days. Avoid strenuous activity that worsens the pain. · Take any prescribed medicines as directed. Follow-up care  Follow up with your healthcare provider, or as advised.    When to seek medical advice  Call your healthcare provider right away i which device is chosen. It may be recommended for women who have anemia or heavy and painful periods. IUDs have thin strings that hang from the opening of the uterus into the vagina. This lets you check that the IUD stays in place.   Things to know about I

## 2020-06-22 NOTE — PROGRESS NOTES
CHIEF COMPLAINT: Patient presents with:  Er F/u: Shane Wheeler f/u from 6/21/20 for chest pain     HPI:     Venkat Bear is a 26year old female presents for right moderate to severe chest pain with burning yesterday went to immediate care in [place] Diagnosis Date   • Breast mass, right 5/10/2018    Comments: Movable large right upper outer quadrant breast mass 6-7 cm diameter ×3and at 9:00 areolar border 2 cm palpable smooth mass movable mild tenderness. Fibroadenoma, Consult: Leydi gerard.    • (BP Location: Left arm, Patient Position: Sitting, Cuff Size: adult)   Pulse 73   Temp 98.1 °F (36.7 °C) (Oral)   Resp 18   Ht 65\"   Wt 103 lb 12.8 oz (47.1 kg)   LMP 06/05/2020 (Exact Date)   SpO2 98%   BMI 17.27 kg/m²   Vital signs reviewed. Appears stat 3. 8    • Chloride 06/21/2020 110    • CO2 06/21/2020 24.0    • Anion Gap 06/21/2020 6    • BUN 06/21/2020 6*   • Creatinine 06/21/2020 0.74    • BUN/CREA Ratio 06/21/2020 8.1*   • Calcium, Total 06/21/2020 8.8    • Calculated Osmolality 06/21/2020 287    • symptomatic will refer to hematology  If melena or epigastric pain then patient to contact office immediately  Can have headaches with anemia but anything persistent or worsening needs to call office sooner-neuro exam normal    2. Atypical chest pain  3.  H questions, comp lications, allergies, or worsening or changing symptoms. Patient is to call with any side effects or complications from the treatments as a result of today.      Problem List:   Patient Active Problem List:     Iron deficiency anemia second

## 2020-07-20 ENCOUNTER — APPOINTMENT (OUTPATIENT)
Dept: GENERAL RADIOLOGY | Age: 30
End: 2020-07-20
Attending: PHYSICIAN ASSISTANT
Payer: COMMERCIAL

## 2020-07-20 ENCOUNTER — HOSPITAL ENCOUNTER (OUTPATIENT)
Age: 30
Discharge: HOME | End: 2020-07-20
Payer: COMMERCIAL

## 2020-07-20 VITALS
WEIGHT: 103.63 LBS | RESPIRATION RATE: 18 BRPM | OXYGEN SATURATION: 100 % | HEART RATE: 76 BPM | HEIGHT: 61 IN | BODY MASS INDEX: 19.57 KG/M2 | SYSTOLIC BLOOD PRESSURE: 118 MMHG | TEMPERATURE: 99 F | DIASTOLIC BLOOD PRESSURE: 74 MMHG

## 2020-07-20 DIAGNOSIS — S63.656A SPRAIN OF METACARPOPHALANGEAL (MCP) JOINT OF RIGHT LITTLE FINGER, INITIAL ENCOUNTER: Primary | ICD-10-CM

## 2020-07-20 PROCEDURE — 99213 OFFICE O/P EST LOW 20 MIN: CPT

## 2020-07-20 PROCEDURE — 73130 X-RAY EXAM OF HAND: CPT | Performed by: PHYSICIAN ASSISTANT

## 2020-07-20 PROCEDURE — 29130 APPL FINGER SPLINT STATIC: CPT

## 2020-07-20 NOTE — ED PROVIDER NOTES
Patient Seen in: [facility]      History   Patient presents with:  Upper Extremity Injury    Stated Complaint: 3 DAYS SPLINTER IN FINGER/PALM AREA    HPI    14-year-old female presents to the clinic for evaluation of right hand/ (37.2 °C) (Temporal)   Resp 18   Ht 154.9 cm (5' 1\")   Wt 47 kg   LMP 07/04/2020 (Exact Date)   SpO2 100%   BMI 19.58 kg/m²         Physical Exam  Vitals signs and nursing note reviewed. Constitutional:       Appearance: Normal appearance.  She is well-d week            Medications Prescribed:  Current Discharge Medication List

## 2020-09-17 ENCOUNTER — TELEPHONE (OUTPATIENT)
Dept: FAMILY MEDICINE CLINIC | Facility: CLINIC | Age: 30
End: 2020-09-17

## 2020-09-17 NOTE — TELEPHONE ENCOUNTER
Patient started with Boday aches yesterday  Patient has vomited x 2 yesterday  She was able drink milk this morning and keep it down.     Denies fever  Denies COVID exposure    Virtual apt made 9/18/2020- patient may cancel if feeling better     She would l

## 2021-02-09 ENCOUNTER — TELEPHONE (OUTPATIENT)
Dept: FAMILY MEDICINE CLINIC | Facility: CLINIC | Age: 31
End: 2021-02-09

## 2021-02-09 NOTE — TELEPHONE ENCOUNTER
Spoke with   Fever started Sunday 100-101 degrees. Fever resolved  Took Nyquil and Dayquil yesterday. Nothing today    Yesterday she was nauseated bu unable before  . This morning traces of blood while vomiting  After warm milk her stomach    COVID t

## 2021-02-09 NOTE — TELEPHONE ENCOUNTER
Pts spouse who is on FYI called office stating pt has been having a fever has been vomiting with traces of blood and also has been having stomach issues. Pt took a [id number] test pre spouse and it was negative.  Pt has a video visit with Dr Tyra Pablo on 02/09/

## 2021-02-10 NOTE — TELEPHONE ENCOUNTER
Pt cancelled video visit at 6:00pm. Please call to verify how pt feeling if need to reschedule? If reoccurring issue, need to address if resolved.     Please inform

## 2021-02-11 NOTE — TELEPHONE ENCOUNTER
Spoke with  and he reports she is doing better. They believe the stomach pain/vomiting may related eating spicey food. She has reduce spicey food intake and is feeling better.  Discussed if temperatures/fever returns she should follow up because that

## 2021-04-13 ENCOUNTER — OFFICE VISIT (OUTPATIENT)
Dept: FAMILY MEDICINE CLINIC | Facility: CLINIC | Age: 31
End: 2021-04-13
Payer: COMMERCIAL

## 2021-04-13 VITALS
OXYGEN SATURATION: 100 % | SYSTOLIC BLOOD PRESSURE: 98 MMHG | DIASTOLIC BLOOD PRESSURE: 60 MMHG | HEART RATE: 88 BPM | WEIGHT: 110.38 LBS | BODY MASS INDEX: 20.84 KG/M2 | HEIGHT: 61 IN | TEMPERATURE: 97 F | RESPIRATION RATE: 18 BRPM

## 2021-04-13 DIAGNOSIS — Z00.00 ANNUAL PHYSICAL EXAM: Primary | ICD-10-CM

## 2021-04-13 DIAGNOSIS — R92.8 ABNORMAL MAMMOGRAM OF LEFT BREAST: ICD-10-CM

## 2021-04-13 DIAGNOSIS — Z86.2 HISTORY OF ANEMIA: ICD-10-CM

## 2021-04-13 DIAGNOSIS — Z13.29 SCREENING FOR ENDOCRINE, NUTRITIONAL, METABOLIC AND IMMUNITY DISORDER: ICD-10-CM

## 2021-04-13 DIAGNOSIS — Z13.228 SCREENING FOR ENDOCRINE, NUTRITIONAL, METABOLIC AND IMMUNITY DISORDER: ICD-10-CM

## 2021-04-13 DIAGNOSIS — Z12.4 SCREENING FOR CERVICAL CANCER: ICD-10-CM

## 2021-04-13 DIAGNOSIS — Z13.21 SCREENING FOR ENDOCRINE, NUTRITIONAL, METABOLIC AND IMMUNITY DISORDER: ICD-10-CM

## 2021-04-13 DIAGNOSIS — Z13.0 SCREENING FOR IRON DEFICIENCY ANEMIA: ICD-10-CM

## 2021-04-13 DIAGNOSIS — Z13.0 SCREENING FOR ENDOCRINE, NUTRITIONAL, METABOLIC AND IMMUNITY DISORDER: ICD-10-CM

## 2021-04-13 DIAGNOSIS — Z13.6 SCREENING FOR HEART DISEASE: ICD-10-CM

## 2021-04-13 PROBLEM — R07.89 ATYPICAL CHEST PAIN: Status: RESOLVED | Noted: 2020-06-22 | Resolved: 2021-04-13

## 2021-04-13 PROBLEM — L24.89 IRRITANT CONTACT DERMATITIS DUE TO OTHER AGENTS: Status: RESOLVED | Noted: 2020-05-18 | Resolved: 2021-04-13

## 2021-04-13 PROCEDURE — 99395 PREV VISIT EST AGE 18-39: CPT | Performed by: FAMILY MEDICINE

## 2021-04-13 PROCEDURE — 88175 CYTOPATH C/V AUTO FLUID REDO: CPT | Performed by: FAMILY MEDICINE

## 2021-04-13 PROCEDURE — 3078F DIAST BP <80 MM HG: CPT | Performed by: FAMILY MEDICINE

## 2021-04-13 PROCEDURE — 87624 HPV HI-RISK TYP POOLED RSLT: CPT | Performed by: FAMILY MEDICINE

## 2021-04-13 PROCEDURE — 3074F SYST BP LT 130 MM HG: CPT | Performed by: FAMILY MEDICINE

## 2021-04-13 PROCEDURE — 3008F BODY MASS INDEX DOCD: CPT | Performed by: FAMILY MEDICINE

## 2021-04-13 NOTE — PROGRESS NOTES
REASON FOR VISIT:    Lisa Glover is a 31year old female who presents for an [address]. Some fatigue- sleeps 7-8h. History of anemia-iron deficiency was on estrogen patch/birth control and stopped a year ago. Uses condoms.   Stat No current outpatient medications on file.      Wt Readings from Last 6 Encounters:  04/13/21 : 110 lb 6.4 oz (50.1 kg)  07/20/20 : 103 lb 9.6 oz (47 kg)  06/22/20 : 103 lb 12.8 oz (47.1 kg)  06/21/20 : 105 lb (47.6 kg)  10/12/18 : 111 lb 6.4 oz (50.5 kg) Depression Screening (PHQ-2/PHQ-9): Over the LAST 2 WEEKS   Little interest or pleasure in doing things (over the last two weeks)?: Not at all    Feeling down, depressed, or hopeless (over the last two weeks)?: Not at all    PHQ-2 SCORE: 0        PREVENTAT SPECIFIC DISEASE MONITORING Internal Lab or Procedure External Lab or Procedure   Annual Monitoring of Persistent     Medications (ACE/ARB, digoxin, diuretics)    Potassium  Annually Potassium (mmol/L)   Date Value   06/21/2020 3.8    No flowsheet data fou Paternal Grandfather    • No Known Problems Daughter    • Cancer Neg    • Blood Disorder Neg       SOCIAL HISTORY:   Social History    Tobacco Use      Smoking status: Never Smoker      Smokeless tobacco: Never Used    Vaping Use      Vaping Use: Never use cyanosis, clubbing or edema  NEURO: Oriented times three, cranial nerves are intact, motor and sensory are grossly intact    ASSESSMENT AND OTHER RELEVANT CHRONIC CONDITIONS:   Ana Oliveros is a 31year old female who presents for an [address] menses or consider Mirena        The patient indicates understanding of these issues and agrees to the plan. Return in about 1 year (around 4/13/2022) for annual physical, sooner if needed. .    Diet counseling perfomed  Exercise counseling perfomed   Cont

## 2021-04-13 NOTE — PATIENT INSTRUCTIONS
Perform labs fasting 8 hours with water or black coffee or or black tea diet  soda only prior to exam.    -Encourage healthy diet of whole food and avoid processed food and sugary drinks and sodas.   Diet should include lean meats and vegetables including 5 exercise? Exercising regularly offers many healthy rewards.  It can help you do all of the following:  · Improve your blood cholesterol level to help prevent further heart trouble  · Lower your blood pressure to help prevent a stroke or heart attack  · Con substitute for professional medical care. Always follow your healthcare professional's instructions. Eating Heart-Healthy Foods  Eating has a big impact on your heart health. In fact, eating healthier can improve several of your heart risks at once.  Gurpreet Washington keep a diary to record what you eat and how often you exercise. Choose the right foods  Aim to make these foods staples of your diet.  If you have diabetes, you may have different recommendations than what is listed here:  · Fruits and vegetables provide p spice up your food without adding calories, fat, or sodium. Try these items: horseradish, hot sauce, lemon, mustard, nonfat salad dressings, and vinegar. For salt-free herbs and spices, try basil, cilantro, cinnamon, pepper, and rosemary.   Date Last Review increased risk for fractures. With age, the quality and quantity of bone declines. You can lessen bone loss by staying active and increasing your calcium intake. Calcium supplements and other osteoporosis treatments do have risks.  So talk with your healthc may vary depending on brand and size.   Daily calcium needs  15to 25years old: 1,300 mg  23to 27years old: 1,000 mg  32to 48years old: 1,000 mg  46to 79years old, women: 1,200 mg  46to 79years old, men: 1,000 mg  Pregnant or nursin to 18 year disease  · Have dark skin pigmentation  · Being a  baby  Vitamin D has many effects in the body.  You may need this test to help your healthcare provider diagnose or treat:  · Problems with the parathyroid gland  · Cancer  · Autoimmune diseases, zuniga vitamin D in supplement form can affect your vitamin D levels. Ask your healthcare provider if any health conditions you have or medicines you take could affect your results.   How do I get ready for this test?  Tell your healthcare provider if you take vit [address]. [facility] [facility], [address] [place] [address]. All rights reserved. This information is not intended as a substitute for professional medical care. Always follow your healthcare professional's instructions.           Living with Marilyn Paz

## 2021-04-26 ENCOUNTER — TELEPHONE (OUTPATIENT)
Dept: FAMILY MEDICINE CLINIC | Facility: CLINIC | Age: 31
End: 2021-04-26

## 2021-04-26 DIAGNOSIS — D50.0 IRON DEFICIENCY ANEMIA SECONDARY TO BLOOD LOSS (CHRONIC): Primary | ICD-10-CM

## 2021-04-26 RX ORDER — NORGESTIMATE AND ETHINYL ESTRADIOL 0.25-0.035
1 KIT ORAL DAILY
Qty: 3 PACKAGE | Refills: 3 | Status: SHIPPED | OUTPATIENT
Start: 2021-04-26 | End: 2021-10-26 | Stop reason: ALTCHOICE

## 2021-04-26 NOTE — PROGRESS NOTES
Results reviewed-patient with increasing iron deficiency anemia. Previously was on birth control and discontinued states menses is now lighter off OCP but has an adequate iron ingestion when compared to blood loss.   Treatment options are restarting birth

## 2021-04-26 NOTE — TELEPHONE ENCOUNTER
Patient notified of results. Patient verbalized understanding. Discussed options for restoring iron levels. Patient declines iron supplementation. Historically iron supplements have caused N/V. She is interesting in starting OCP.  Would like to kn

## 2021-04-26 NOTE — TELEPHONE ENCOUNTER
----- Message from Rizwana Hernandez DO sent at 4/25/2021  9:47 PM CDT -----  Results reviewed-patient with increasing iron deficiency anemia.   Previously was on birth control and discontinued states menses is now lighter off OCP but has an adequate iron ruth ann

## 2021-04-27 NOTE — TELEPHONE ENCOUNTER
Spoke to pt and let them know message and recommendation per Dr Winston Agee. Patient voiced understanding.

## 2021-04-27 NOTE — TELEPHONE ENCOUNTER
Tolerated OCPs/patch previously. Estrogen and progesterone content are similar. Educated on risks of blood clot when on birth control and higher risk of travel.  Flights longer than 2 hours needs to get up and move around or pedal pumps and maintain hydrat

## 2021-06-16 ENCOUNTER — TELEPHONE (OUTPATIENT)
Dept: FAMILY MEDICINE CLINIC | Facility: CLINIC | Age: 31
End: 2021-06-16

## 2021-06-16 DIAGNOSIS — Z01.84 IMMUNITY STATUS TESTING: Primary | ICD-10-CM

## 2021-06-16 DIAGNOSIS — Z23 NEED FOR VACCINATION: ICD-10-CM

## 2021-06-16 NOTE — TELEPHONE ENCOUNTER
Patient's mom dropped off some form that needed to be filled out for . Please call her when completed.

## 2021-06-17 NOTE — TELEPHONE ENCOUNTER
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 Per facility DCFS requirements listed on form:  Need MMR immune status  TB test  Orders pended.

## 2021-06-18 ENCOUNTER — NURSE ONLY (OUTPATIENT)
Dept: FAMILY MEDICINE CLINIC | Facility: CLINIC | Age: 31
End: 2021-06-18
Payer: COMMERCIAL

## 2021-06-18 PROCEDURE — 86580 TB INTRADERMAL TEST: CPT | Performed by: FAMILY MEDICINE

## 2021-06-18 NOTE — TELEPHONE ENCOUNTER
Spoke to pt's  and informed of orders.  MMR titers re-ordered - were not showing up as [facility].   Nurse visit scheduled for TB test

## 2021-06-18 NOTE — PROGRESS NOTES
PPD Administered.   Pt has appt for TB Read 6/21/21  Paperwork filled out and to be completed 6/21/21  Gave pt [facility] orders for MMR titer

## 2021-06-21 ENCOUNTER — NURSE ONLY (OUTPATIENT)
Dept: FAMILY MEDICINE CLINIC | Facility: CLINIC | Age: 31
End: 2021-06-21
Payer: COMMERCIAL

## 2021-06-24 NOTE — TELEPHONE ENCOUNTER
Patient notified of results of MMR titers- mumps equivocal.  Patient verbalized understanding. Scheduled for nurse visit for MMR tomorrow. MMR order pended for signature.

## 2021-06-25 ENCOUNTER — NURSE ONLY (OUTPATIENT)
Dept: FAMILY MEDICINE CLINIC | Facility: CLINIC | Age: 31
End: 2021-06-25
Payer: COMMERCIAL

## 2021-06-25 PROCEDURE — 90707 MMR VACCINE SC: CPT | Performed by: FAMILY MEDICINE

## 2021-06-25 PROCEDURE — 90471 IMMUNIZATION ADMIN: CPT | Performed by: FAMILY MEDICINE

## 2021-09-02 ENCOUNTER — TELEPHONE (OUTPATIENT)
Dept: FAMILY MEDICINE CLINIC | Facility: CLINIC | Age: 31
End: 2021-09-02

## 2021-09-02 NOTE — TELEPHONE ENCOUNTER
Pts spouse who is on FYI called office asking to speak to a nurse in regards to pt having a cough, strep and has been vomiting. Pts spouse declined a vide visit.

## 2021-09-02 NOTE — TELEPHONE ENCOUNTER
Spoke to pts   Pt has been on antibiotics for strep x 1 week.   Pt is still experiencing cough x 2 weeks and having post tussive emesis sometimes   Recommended be seen at IC today   voiced understanding and will bring her to IC

## 2021-09-15 ENCOUNTER — TELEPHONE (OUTPATIENT)
Dept: FAMILY MEDICINE CLINIC | Facility: CLINIC | Age: 31
End: 2021-09-15

## 2021-09-15 NOTE — TELEPHONE ENCOUNTER
Jamil-pt  called stating pt with c/o vomiting, unable to eat or drink x2 days  Diarrhea started this morning    Was seen recently at [facility] for cough an placed on antibiotics for strep. Finished with antibiotics no longer has cough.      Risk for dehyd

## 2021-09-16 ENCOUNTER — HOSPITAL ENCOUNTER (OUTPATIENT)
Age: 31
Discharge: HOME | End: 2021-09-16
Payer: COMMERCIAL

## 2021-09-16 ENCOUNTER — HOSPITAL ENCOUNTER (OUTPATIENT)
Age: 31
Discharge: LEFT BEFORE DISCHARGE | End: 2021-09-16
Payer: COMMERCIAL

## 2021-09-16 VITALS
DIASTOLIC BLOOD PRESSURE: 94 MMHG | HEART RATE: 81 BPM | SYSTOLIC BLOOD PRESSURE: 124 MMHG | RESPIRATION RATE: 16 BRPM | TEMPERATURE: 98 F

## 2021-09-16 DIAGNOSIS — R11.2 NAUSEA VOMITING AND DIARRHEA: Primary | ICD-10-CM

## 2021-09-16 DIAGNOSIS — R19.7 NAUSEA VOMITING AND DIARRHEA: Primary | ICD-10-CM

## 2021-09-16 DIAGNOSIS — Z20.822 ENCOUNTER FOR LABORATORY TESTING FOR COVID-19 VIRUS: ICD-10-CM

## 2021-09-16 LAB
B-HCG UR QL: NEGATIVE
POCT BILIRUBIN URINE: NEGATIVE
POCT BLOOD URINE: NEGATIVE
POCT GLUCOSE URINE: NEGATIVE MG/DL
POCT KETONE URINE: NEGATIVE MG/DL
POCT LEUKOCYTE ESTERASE URINE: NEGATIVE
POCT NITRITE URINE: NEGATIVE
POCT PH URINE: 5.5 (ref 5–8)
POCT PROTEIN URINE: NEGATIVE MG/DL
POCT SPECIFIC GRAVITY URINE: 1.02
POCT URINE CLARITY: CLEAR
POCT URINE COLOR: YELLOW
POCT UROBILINOGEN URINE: 0.2 MG/DL
SARS-COV-2 RNA RESP QL NAA+PROBE: NOT DETECTED

## 2021-09-16 PROCEDURE — U0002 COVID-19 LAB TEST NON-CDC: HCPCS | Performed by: PHYSICIAN ASSISTANT

## 2021-09-16 PROCEDURE — 81002 URINALYSIS NONAUTO W/O SCOPE: CPT | Performed by: PHYSICIAN ASSISTANT

## 2021-09-16 PROCEDURE — 99203 OFFICE O/P NEW LOW 30 MIN: CPT | Performed by: PHYSICIAN ASSISTANT

## 2021-09-16 PROCEDURE — 81025 URINE PREGNANCY TEST: CPT | Performed by: PHYSICIAN ASSISTANT

## 2021-09-16 RX ORDER — ONDANSETRON 4 MG/1
4 TABLET, ORALLY DISINTEGRATING ORAL EVERY 4 HOURS PRN
Qty: 30 TABLET | Refills: 0 | Status: SHIPPED | OUTPATIENT
Start: 2021-09-16 | End: 2021-10-26 | Stop reason: ALTCHOICE

## 2021-09-17 NOTE — ED PROVIDER NOTES
Patient Seen in: [facility] [address]      History   Patient presents with:  Nausea/Vomiting/Diarrhea    Stated Complaint: vomitting x 2 days     Subjective:   HPI    42-year-old female who works at a  here with complaint of nausea vomi BP (!) 124/94   Pulse 81   Resp 16   Temp 98.1 °F (36.7 °C)   Temp src Temporal   SpO2    O2 Device None (Room air)       Current:BP (!) 124/94   Pulse 81   Temp 98.1 °F (36.7 °C) (Temporal)   Resp 16   Ht (P) 167.6 cm (5' 6\")   Wt (P) 48.7 kg   LMP 09/ emergency room. Otherwise follow-up with your primary care physician. The patient is encouraged to return if any concerning symptoms arise. Additional verbal discharge instructions are given and discussed. Discharge medications are discussed.  The margareth

## 2021-09-17 NOTE — ED INITIAL ASSESSMENT (HPI)
Yesterday, c/o intermittent abdominal cramping, body aches with vomiting. Tested negative for covid 2 weeks ago. Denies recent travel. Temp max 100.8.

## 2021-10-26 ENCOUNTER — HOSPITAL ENCOUNTER (OUTPATIENT)
Age: 31
Discharge: HOME | End: 2021-10-26
Payer: COMMERCIAL

## 2021-10-26 VITALS
DIASTOLIC BLOOD PRESSURE: 69 MMHG | WEIGHT: 110.25 LBS | OXYGEN SATURATION: 100 % | TEMPERATURE: 99 F | SYSTOLIC BLOOD PRESSURE: 109 MMHG | RESPIRATION RATE: 18 BRPM | HEART RATE: 99 BPM | BODY MASS INDEX: 21 KG/M2

## 2021-10-26 DIAGNOSIS — K12.2 UVULITIS: ICD-10-CM

## 2021-10-26 DIAGNOSIS — J02.0 STREP PHARYNGITIS: Primary | ICD-10-CM

## 2021-10-26 DIAGNOSIS — J02.9 SORE THROAT: ICD-10-CM

## 2021-10-26 PROCEDURE — 99213 OFFICE O/P EST LOW 20 MIN: CPT | Performed by: PHYSICIAN ASSISTANT

## 2021-10-26 PROCEDURE — U0002 COVID-19 LAB TEST NON-CDC: HCPCS | Performed by: PHYSICIAN ASSISTANT

## 2021-10-26 PROCEDURE — 87880 STREP A ASSAY W/OPTIC: CPT | Performed by: PHYSICIAN ASSISTANT

## 2021-10-26 RX ORDER — AMOXICILLIN 500 MG/1
500 TABLET, FILM COATED ORAL 2 TIMES DAILY
Qty: 20 TABLET | Refills: 0 | Status: SHIPPED | OUTPATIENT
Start: 2021-10-26 | End: 2021-11-05

## 2021-10-26 RX ORDER — DEXAMETHASONE 4 MG/1
12 TABLET ORAL ONCE
Status: COMPLETED | OUTPATIENT
Start: 2021-10-26 | End: 2021-10-26

## 2021-10-26 NOTE — ED PROVIDER NOTES
Patient Seen in: Immediate [facility]      History   Patient presents with:  Sore Throat    Stated Complaint: sore throat and body aches x 1 day     Subjective:   HPI    65-year-old female who works at a  comes in today complaining of s age   Head: Normocephalic, without obvious abnormality   Eyes: PERRL,  conjunctiva and cornea clear, both eyes   Ears: TM pearly gray color and semitransparent, external ear canals normal, both ears   Nose: Nares symmetrical, septum midline, mucosa normal, taking these medications    amoxicillin 500 MG Oral Tab  Take 1 tablet (500 mg total) by mouth 2 (two) times daily for 10 days.   Qty: 20 tablet Refills: 0          I have given the patient instructions regarding her diagnosis, expectations, follow up, and

## 2021-10-26 NOTE — ED INITIAL ASSESSMENT (HPI)
Pt presents with sorethroat stating that her uvula is swollen, headache, bodyaches starting this afternoon

## 2023-01-16 RX ORDER — AMOXICILLIN 875 MG/1
875 TABLET, COATED ORAL 2 TIMES DAILY
COMMUNITY
Start: 2021-08-25 | End: 2023-01-17 | Stop reason: ALTCHOICE

## 2023-01-17 ENCOUNTER — OFFICE VISIT (OUTPATIENT)
Dept: FAMILY MEDICINE CLINIC | Facility: CLINIC | Age: 33
End: 2023-01-17
Payer: COMMERCIAL

## 2023-01-17 VITALS
BODY MASS INDEX: 20.42 KG/M2 | OXYGEN SATURATION: 100 % | RESPIRATION RATE: 20 BRPM | DIASTOLIC BLOOD PRESSURE: 64 MMHG | TEMPERATURE: 97 F | WEIGHT: 112.38 LBS | HEART RATE: 80 BPM | HEIGHT: 62.01 IN | SYSTOLIC BLOOD PRESSURE: 108 MMHG

## 2023-01-17 DIAGNOSIS — Z23 NEED FOR VACCINATION: ICD-10-CM

## 2023-01-17 DIAGNOSIS — Z13.21 SCREENING FOR ENDOCRINE, NUTRITIONAL, METABOLIC AND IMMUNITY DISORDER: ICD-10-CM

## 2023-01-17 DIAGNOSIS — Z13.29 SCREENING FOR ENDOCRINE, NUTRITIONAL, METABOLIC AND IMMUNITY DISORDER: ICD-10-CM

## 2023-01-17 DIAGNOSIS — Z13.0 SCREENING FOR ENDOCRINE, NUTRITIONAL, METABOLIC AND IMMUNITY DISORDER: ICD-10-CM

## 2023-01-17 DIAGNOSIS — E78.00 PURE HYPERCHOLESTEROLEMIA: ICD-10-CM

## 2023-01-17 DIAGNOSIS — Z13.228 SCREENING FOR ENDOCRINE, NUTRITIONAL, METABOLIC AND IMMUNITY DISORDER: ICD-10-CM

## 2023-01-17 DIAGNOSIS — Z31.9 PATIENT DESIRES PREGNANCY: ICD-10-CM

## 2023-01-17 DIAGNOSIS — Z00.00 ANNUAL PHYSICAL EXAM: Primary | ICD-10-CM

## 2023-01-17 DIAGNOSIS — D50.0 IRON DEFICIENCY ANEMIA SECONDARY TO BLOOD LOSS (CHRONIC): ICD-10-CM

## 2023-01-17 PROCEDURE — 99395 PREV VISIT EST AGE 18-39: CPT | Performed by: FAMILY MEDICINE

## 2023-01-17 PROCEDURE — 3078F DIAST BP <80 MM HG: CPT | Performed by: FAMILY MEDICINE

## 2023-01-17 PROCEDURE — 3074F SYST BP LT 130 MM HG: CPT | Performed by: FAMILY MEDICINE

## 2023-01-17 PROCEDURE — 3008F BODY MASS INDEX DOCD: CPT | Performed by: FAMILY MEDICINE

## 2023-01-17 RX ORDER — PRENATAL VIT 40/IRON/FOLIC/DHA 27-0.8-25
1 CAPSULE ORAL DAILY
Qty: 90 CAPSULE | Refills: 3 | Status: SHIPPED | OUTPATIENT
Start: 2023-01-17 | End: 2023-02-16

## 2023-01-17 RX ORDER — VITAMIN A ACETATE, BETA CAROTENE, ASCORBIC ACID, CHOLECALCIFEROL, .ALPHA.-TOCOPHEROL ACETATE, DL-, THIAMINE MONONITRATE, RIBOFLAVIN, NIACINAMIDE, PYRIDOXINE HYDROCHLORIDE, FOLIC ACID, CYANOCOBALAMIN, CALCIUM CARBONATE, FERROUS FUMARATE, ZINC OXIDE, CUPRIC OXIDE 3080; 12; 120; 400; 1; 1.84; 3; 20; 22; 920; 25; 200; 27; 10; 2 [IU]/1; UG/1; MG/1; [IU]/1; MG/1; MG/1; MG/1; MG/1; MG/1; [IU]/1; MG/1; MG/1; MG/1; MG/1; MG/1
1 TABLET, FILM COATED ORAL DAILY
Qty: 90 TABLET | Refills: 3 | Status: SHIPPED | OUTPATIENT
Start: 2023-01-17 | End: 2023-02-16

## 2023-01-17 NOTE — PATIENT INSTRUCTIONS
Perform labs fasting 8 hours with water or black coffee or or black tea diet  soda only prior to exam.    -Encourage healthy diet of whole food and avoid processed food and sugary drinks and sodas. Diet should include lean meats and vegetables including 5-7 servings of fruit and vegetables total in 1 day. Never skip breakfast.  -Encouraged exercise 30 minutes to 60 minutes 3-5 times weekly for 150minutes or more to prevent obesity and chronic disease and eliminate stress and its effect on the body.  -encouraged to continue not smoking or vaping  - recommend condom use per CDC recommendation for all  or unmarried couples  -mammogram order given if 42years old or older  - immunizations-annual flu shot recommended  -Vitamin D3  2000 units daily recommended- buy Over-the-counter  -Recommend 1000mg of calcium daily for osteoporosis prevention discussed. Need to ingest 1000mg of calcium daily to prevent osteoporosis later in life. I.e. one 8 ounce glass of silk Simla milk has 450 mg of calcium and label states 45%. Labels list calcium percentages not milligrams. To calculate milligrams per serving remove the percentage and add a zero (0).  I.e. 9% calcium equals 90 mg  -thin prep pap recommended every 3 years-If previous pap was normal  sooner as directed by your doctor.

## 2023-01-18 PROBLEM — Z86.2 HISTORY OF ANEMIA: Status: RESOLVED | Noted: 2021-04-13 | Resolved: 2023-01-18

## 2023-01-18 PROBLEM — D24.1 FIBROADENOMA OF RIGHT BREAST: Status: ACTIVE | Noted: 2021-04-13

## 2023-01-19 LAB
% SATURATION: 8 % (CALC) (ref 16–45)
ABSOLUTE BASOPHILS: 41 CELLS/UL (ref 0–200)
ABSOLUTE EOSINOPHILS: 189 CELLS/UL (ref 15–500)
ABSOLUTE LYMPHOCYTES: 1841 CELLS/UL (ref 850–3900)
ABSOLUTE MONOCYTES: 384 CELLS/UL (ref 200–950)
ABSOLUTE NEUTROPHILS: 3446 CELLS/UL (ref 1500–7800)
ALBUMIN/GLOBULIN RATIO: 1.6 (CALC) (ref 1–2.5)
ALBUMIN: 4.2 G/DL (ref 3.6–5.1)
ALKALINE PHOSPHATASE: 29 U/L (ref 31–125)
ALT: 14 U/L (ref 6–29)
AST: 15 U/L (ref 10–30)
BASOPHILS: 0.7 %
BILIRUBIN, TOTAL: 0.4 MG/DL (ref 0.2–1.2)
BUN: 12 MG/DL (ref 7–25)
CALCIUM: 9.4 MG/DL (ref 8.6–10.2)
CARBON DIOXIDE: 26 MMOL/L (ref 20–32)
CHLORIDE: 109 MMOL/L (ref 98–110)
CHOL/HDLC RATIO: 3.4 (CALC)
CHOLESTEROL, TOTAL: 154 MG/DL
CREATININE: 0.71 MG/DL (ref 0.5–0.97)
EGFR: 115 ML/MIN/1.73M2
EOSINOPHILS: 3.2 %
FERRITIN: 4 NG/ML (ref 16–154)
GLOBULIN: 2.6 G/DL (CALC) (ref 1.9–3.7)
GLUCOSE: 89 MG/DL (ref 65–99)
HDL CHOLESTEROL: 45 MG/DL
HEMATOCRIT: 34.2 % (ref 35–45)
HEMOGLOBIN: 10.4 G/DL (ref 11.7–15.5)
IRON BINDING CAPACITY: 474 MCG/DL (CALC) (ref 250–450)
IRON, TOTAL: 39 MCG/DL (ref 40–190)
LDL-CHOLESTEROL: 94 MG/DL (CALC)
LYMPHOCYTES: 31.2 %
MCH: 22.5 PG (ref 27–33)
MCHC: 30.4 G/DL (ref 32–36)
MCV: 74 FL (ref 80–100)
MONOCYTES: 6.5 %
MPV: 11.3 FL (ref 7.5–12.5)
NEUTROPHILS: 58.4 %
NON-HDL CHOLESTEROL: 109 MG/DL (CALC)
PLATELET COUNT: 309 THOUSAND/UL (ref 140–400)
POTASSIUM: 4.4 MMOL/L (ref 3.5–5.3)
PROTEIN, TOTAL: 6.8 G/DL (ref 6.1–8.1)
RDW: 14.5 % (ref 11–15)
RED BLOOD CELL COUNT: 4.62 MILLION/UL (ref 3.8–5.1)
SODIUM: 142 MMOL/L (ref 135–146)
TRIGLYCERIDES: 63 MG/DL
TSH W/REFLEX TO FT4: 2.08 MIU/L
WHITE BLOOD CELL COUNT: 5.9 THOUSAND/UL (ref 3.8–10.8)

## 2023-01-20 ENCOUNTER — TELEPHONE (OUTPATIENT)
Dept: FAMILY MEDICINE CLINIC | Facility: CLINIC | Age: 33
End: 2023-01-20

## 2023-01-25 ENCOUNTER — TELEPHONE (OUTPATIENT)
Dept: FAMILY MEDICINE CLINIC | Facility: CLINIC | Age: 33
End: 2023-01-25

## 2023-01-25 NOTE — TELEPHONE ENCOUNTER
Patient  calling said they were in to see doctor and they are still waiting for her iron prescription please send to [facility] on [address] In [place].

## 2023-01-25 NOTE — TELEPHONE ENCOUNTER
S/w pharmacist at [facility],     Iron tablets are available otc and prenatal plus supplement available for pick-up. S/w Sola Gerber   Informed pt of above information. Patient v/u and will  supplements.

## 2023-03-21 ENCOUNTER — TELEPHONE (OUTPATIENT)
Dept: FAMILY MEDICINE CLINIC | Facility: CLINIC | Age: 33
End: 2023-03-21

## 2023-03-21 NOTE — TELEPHONE ENCOUNTER
Pt came to drop off form for her job and it was placed in the nurse bin. Pt wants to be called once form is done.

## 2023-03-23 NOTE — TELEPHONE ENCOUNTER
Patient is missing TB test and MMR vaccine #2 please schedule nurse visit for patient. LMOM to return call to the office. Provided pt office phone [phone] along with office hours.

## 2023-03-24 ENCOUNTER — NURSE ONLY (OUTPATIENT)
Dept: FAMILY MEDICINE CLINIC | Facility: CLINIC | Age: 33
End: 2023-03-24
Payer: COMMERCIAL

## 2023-03-24 DIAGNOSIS — Z11.1 SCREENING FOR TUBERCULOSIS: ICD-10-CM

## 2023-03-24 DIAGNOSIS — Z23 NEED FOR VACCINATION: Primary | ICD-10-CM

## 2023-03-24 PROBLEM — R92.8 ABNORMAL MAMMOGRAM OF LEFT BREAST: Status: ACTIVE | Noted: 2021-04-13

## 2023-03-24 PROCEDURE — 90471 IMMUNIZATION ADMIN: CPT | Performed by: FAMILY MEDICINE

## 2023-03-24 PROCEDURE — 90707 MMR VACCINE SC: CPT | Performed by: FAMILY MEDICINE

## 2023-03-27 ENCOUNTER — NURSE ONLY (OUTPATIENT)
Dept: FAMILY MEDICINE CLINIC | Facility: CLINIC | Age: 33
End: 2023-03-27
Payer: COMMERCIAL

## 2023-05-22 ENCOUNTER — TELEPHONE (OUTPATIENT)
Dept: FAMILY MEDICINE CLINIC | Facility: CLINIC | Age: 33
End: 2023-05-22

## 2023-05-23 NOTE — TELEPHONE ENCOUNTER
Form reviewed and signed by the provider. Unable to leave a voicemail. Need to inform pt that form is completed and placed at the  for pt to . Form also faxed to [employer] at [phone]. Confirmation received. Sent to scan.

## 2023-07-10 ENCOUNTER — PATIENT MESSAGE (OUTPATIENT)
Dept: FAMILY MEDICINE CLINIC | Facility: CLINIC | Age: 33
End: 2023-07-10

## 2023-07-10 NOTE — TELEPHONE ENCOUNTER
From: Vadim Stroud  To: Jeff Clark DO  Sent: 7/10/2023 10:05 AM CDT  Subject: General Update    Hi Dr. Jacek Lott,  I have tested positive in an at-home pregnancy test and would like to proceed to meet a gynecologist next. Wanted to provide you an update and thank you as well for your kind advice and care. Thanks,  Bill Coleman.

## 2023-08-01 LAB
ANTIBODY SCREEN OB: NEGATIVE
HEPATITIS B SURFACE ANTIGEN OB: NEGATIVE
HIV ANTIGEN-ANTIBODY QUAL: NONREACTIVE
RH FACTOR OB: POSITIVE
SYPHILIS-TOTAL QUAL: NONREACTIVE

## 2023-09-19 ENCOUNTER — APPOINTMENT (OUTPATIENT)
Dept: ULTRASOUND IMAGING | Facility: HOSPITAL | Age: 33
End: 2023-09-19
Attending: EMERGENCY MEDICINE
Payer: COMMERCIAL

## 2023-09-19 ENCOUNTER — HOSPITAL ENCOUNTER (EMERGENCY)
Facility: HOSPITAL | Age: 33
Discharge: HOME | End: 2023-09-20
Attending: EMERGENCY MEDICINE
Payer: COMMERCIAL

## 2023-09-19 VITALS
BODY MASS INDEX: 21.44 KG/M2 | DIASTOLIC BLOOD PRESSURE: 66 MMHG | HEIGHT: 63 IN | RESPIRATION RATE: 16 BRPM | WEIGHT: 121 LBS | SYSTOLIC BLOOD PRESSURE: 108 MMHG | OXYGEN SATURATION: 98 % | TEMPERATURE: 98 F | HEART RATE: 91 BPM

## 2023-09-19 DIAGNOSIS — R10.2 PELVIC PAIN DURING PREGNANCY: Primary | ICD-10-CM

## 2023-09-19 DIAGNOSIS — O26.899 PELVIC PAIN DURING PREGNANCY: Primary | ICD-10-CM

## 2023-09-19 LAB
ALBUMIN SERPL-MCNC: 2.9 G/DL (ref 3.4–5)
ALBUMIN/GLOB SERPL: 0.7 {RATIO} (ref 1–2)
ALP LIVER SERPL-CCNC: 37 U/L
ALT SERPL-CCNC: 18 U/L
ANION GAP SERPL CALC-SCNC: 7 MMOL/L (ref 0–18)
AST SERPL-CCNC: 14 U/L (ref 15–37)
BASOPHILS # BLD AUTO: 0.04 X10(3) UL (ref 0–0.2)
BASOPHILS NFR BLD AUTO: 0.4 %
BILIRUB SERPL-MCNC: 0.3 MG/DL (ref 0.1–2)
BILIRUB UR QL STRIP.AUTO: NEGATIVE
BUN BLD-MCNC: 9 MG/DL (ref 7–18)
CALCIUM BLD-MCNC: 9 MG/DL (ref 8.5–10.1)
CHLORIDE SERPL-SCNC: 108 MMOL/L (ref 98–112)
CLARITY UR REFRACT.AUTO: CLEAR
CO2 SERPL-SCNC: 24 MMOL/L (ref 21–32)
COLOR UR AUTO: COLORLESS
CREAT BLD-MCNC: 0.48 MG/DL
EGFRCR SERPLBLD CKD-EPI 2021: 128 ML/MIN/1.73M2 (ref 60–?)
EOSINOPHIL # BLD AUTO: 0.32 X10(3) UL (ref 0–0.7)
EOSINOPHIL NFR BLD AUTO: 3.4 %
ERYTHROCYTE [DISTWIDTH] IN BLOOD BY AUTOMATED COUNT: 13.3 %
FLUAV + FLUBV RNA SPEC NAA+PROBE: NEGATIVE
FLUAV + FLUBV RNA SPEC NAA+PROBE: NEGATIVE
GLOBULIN PLAS-MCNC: 4.1 G/DL (ref 2.8–4.4)
GLUCOSE BLD-MCNC: 90 MG/DL (ref 70–99)
GLUCOSE UR STRIP.AUTO-MCNC: NORMAL MG/DL
HCT VFR BLD AUTO: 32.4 %
HGB BLD-MCNC: 11.3 G/DL
IMM GRANULOCYTES # BLD AUTO: 0.09 X10(3) UL (ref 0–1)
IMM GRANULOCYTES NFR BLD: 1 %
KETONES UR STRIP.AUTO-MCNC: NEGATIVE MG/DL
LEUKOCYTE ESTERASE UR QL STRIP.AUTO: NEGATIVE
LYMPHOCYTES # BLD AUTO: 1.51 X10(3) UL (ref 1–4)
LYMPHOCYTES NFR BLD AUTO: 16.1 %
MCH RBC QN AUTO: 28 PG (ref 26–34)
MCHC RBC AUTO-ENTMCNC: 34.9 G/DL (ref 31–37)
MCV RBC AUTO: 80.2 FL
MONOCYTES # BLD AUTO: 0.57 X10(3) UL (ref 0.1–1)
MONOCYTES NFR BLD AUTO: 6.1 %
NEUTROPHILS # BLD AUTO: 6.85 X10 (3) UL (ref 1.5–7.7)
NEUTROPHILS # BLD AUTO: 6.85 X10(3) UL (ref 1.5–7.7)
NEUTROPHILS NFR BLD AUTO: 73 %
NITRITE UR QL STRIP.AUTO: NEGATIVE
OSMOLALITY SERPL CALC.SUM OF ELEC: 286 MOSM/KG (ref 275–295)
PH UR STRIP.AUTO: 5 [PH] (ref 5–8)
PLATELET # BLD AUTO: 170 10(3)UL (ref 150–450)
POTASSIUM SERPL-SCNC: 3.8 MMOL/L (ref 3.5–5.1)
PROT SERPL-MCNC: 7 G/DL (ref 6.4–8.2)
PROT UR STRIP.AUTO-MCNC: NEGATIVE MG/DL
RBC # BLD AUTO: 4.04 X10(6)UL
RBC UR QL AUTO: NEGATIVE
RSV RNA SPEC NAA+PROBE: NEGATIVE
SARS-COV-2 RNA RESP QL NAA+PROBE: NOT DETECTED
SODIUM SERPL-SCNC: 139 MMOL/L (ref 136–145)
SP GR UR STRIP.AUTO: 1.01 (ref 1–1.03)
UROBILINOGEN UR STRIP.AUTO-MCNC: NORMAL MG/DL
WBC # BLD AUTO: 9.4 X10(3) UL (ref 4–11)

## 2023-09-19 PROCEDURE — 85025 COMPLETE CBC W/AUTO DIFF WBC: CPT | Performed by: EMERGENCY MEDICINE

## 2023-09-19 PROCEDURE — 99284 EMERGENCY DEPT VISIT MOD MDM: CPT

## 2023-09-19 PROCEDURE — 76815 OB US LIMITED FETUS(S): CPT | Performed by: EMERGENCY MEDICINE

## 2023-09-19 PROCEDURE — 0241U SARS-COV-2/FLU A AND B/RSV BY PCR (GENEXPERT): CPT | Performed by: EMERGENCY MEDICINE

## 2023-09-19 PROCEDURE — 81003 URINALYSIS AUTO W/O SCOPE: CPT | Performed by: EMERGENCY MEDICINE

## 2023-09-19 PROCEDURE — 80053 COMPREHEN METABOLIC PANEL: CPT | Performed by: EMERGENCY MEDICINE

## 2023-09-19 PROCEDURE — 96360 HYDRATION IV INFUSION INIT: CPT

## 2023-09-19 RX ORDER — CHOLECALCIFEROL (VITAMIN D3) 25 MCG
1 TABLET,CHEWABLE ORAL DAILY
COMMUNITY

## 2023-09-19 RX ORDER — ACETAMINOPHEN 325 MG/1
650 TABLET ORAL ONCE
Status: COMPLETED | OUTPATIENT
Start: 2023-09-19 | End: 2023-09-19

## 2023-09-20 NOTE — ED INITIAL ASSESSMENT (HPI)
Pelvic pain that radiates to left hip and down left leg.  - 16 weeks pregnant. Reports white vaginal discharge.

## 2023-12-15 LAB — HIV ANTIGEN-ANTIBODY QUAL: NONREACTIVE

## 2024-02-14 LAB — STREPTOCOCCUS GROUP B PCR: NEGATIVE

## 2024-02-15 ENCOUNTER — HOSPITAL ENCOUNTER (OUTPATIENT)
Facility: HOSPITAL | Age: 34
Discharge: HOME | End: 2024-02-15
Attending: OBSTETRICS & GYNECOLOGY | Admitting: OBSTETRICS & GYNECOLOGY
Payer: COMMERCIAL

## 2024-02-15 VITALS
HEIGHT: 62 IN | DIASTOLIC BLOOD PRESSURE: 71 MMHG | TEMPERATURE: 98 F | WEIGHT: 149 LBS | BODY MASS INDEX: 27.42 KG/M2 | RESPIRATION RATE: 18 BRPM | SYSTOLIC BLOOD PRESSURE: 107 MMHG | HEART RATE: 112 BPM

## 2024-02-15 PROCEDURE — 99212 OFFICE O/P EST SF 10 MIN: CPT

## 2024-02-15 PROCEDURE — 59025 FETAL NON-STRESS TEST: CPT

## 2024-02-15 NOTE — PROGRESS NOTES
Written and verbal instructions given. Pt demonstrates understanding. Ambulatory off department in stable condition. Informed to follow-up with physician as scheduled. Denies questions or concerns.

## 2024-02-15 NOTE — NST
Nonstress Test   Patient: Elin Espitia    Gestation: 36w2d    NST:                   Accelerations: Yes           Decelerations: None            Baseline: 135 BPM           Uterine Irritability: Yes           Contractions: Not present                                        Acoustic Stimulator: No           Nonstress Test Interpretation: Reactive                                 Additional Comments      Physician Evaluation      NST Interpretation: Reactive    Disposition:   Discharged    Comments:    none    Kimberly Lacy MD

## 2024-02-15 NOTE — DISCHARGE INSTRUCTIONS
Discharge Instructions    Diet: Regular  Activity: Normal activity         General Instructions    Call your OB doctor if: Fluid leaking from your vagina;Uterine contractions 10 minutes or closer for 1 to 2 hours;Uterine contractions increasing in intensity and frequency;Decrease in fetal movement;Vaginal bleeding;Temperature greater than 100F;Vaginal or rectal pressure  Early labor comfort measures: Drink fluids and eat small light meals;Relax, sleep, take a warm bath or shower for 30 minutes or less;Take a walk

## 2024-02-15 NOTE — PROGRESS NOTES
Pt is a 33 year old female admitted to TRG2/TRG2-A.     Chief Complaint   Patient presents with    Vaginal Bleeding     Pt c/o of bright red vaginal bleeding that began this morning. Has now decreased. Pt also c/o decreased fetal movement. Denies contractions and/or LOF.       Pt is  36w2d intra-uterine pregnancy.  History obtained. Oriented to room, staff, and plan of care.

## 2024-02-27 ENCOUNTER — TELEPHONE (OUTPATIENT)
Dept: OBGYN UNIT | Facility: HOSPITAL | Age: 34
End: 2024-02-27

## 2024-02-28 ENCOUNTER — TELEPHONE (OUTPATIENT)
Dept: OBGYN UNIT | Facility: HOSPITAL | Age: 34
End: 2024-02-28

## 2024-02-29 ENCOUNTER — TELEPHONE (OUTPATIENT)
Dept: OBGYN UNIT | Facility: HOSPITAL | Age: 34
End: 2024-02-29

## 2024-03-01 ENCOUNTER — TELEPHONE (OUTPATIENT)
Dept: OBGYN UNIT | Facility: HOSPITAL | Age: 34
End: 2024-03-01

## 2024-03-02 ENCOUNTER — HOSPITAL ENCOUNTER (OUTPATIENT)
Facility: HOSPITAL | Age: 34
Discharge: HOME | End: 2024-03-03
Attending: OBSTETRICS & GYNECOLOGY | Admitting: OBSTETRICS & GYNECOLOGY
Payer: COMMERCIAL

## 2024-03-02 VITALS
SYSTOLIC BLOOD PRESSURE: 114 MMHG | HEART RATE: 86 BPM | DIASTOLIC BLOOD PRESSURE: 78 MMHG | RESPIRATION RATE: 16 BRPM | BODY MASS INDEX: 27.26 KG/M2 | WEIGHT: 150 LBS | HEIGHT: 62.01 IN | TEMPERATURE: 98 F

## 2024-03-02 PROCEDURE — 59025 FETAL NON-STRESS TEST: CPT

## 2024-03-02 PROCEDURE — 99213 OFFICE O/P EST LOW 20 MIN: CPT

## 2024-03-03 NOTE — PROGRESS NOTES
Pt is a 33 year old female admitted to TRG3/TRG3-A.     Chief Complaint   Patient presents with    Decreased Fetal Movement     Patient here with c/o decreased fetal movement today. She has been regularly counting 10 movements in 2 hours, but tonight when she was doing her fetal kick count, she only got 7 in a 2 hour window after her dinner meal. She denies any regular painful contractions, leaking of fluid or bright red vaginal bleeding. Last time she felt normal fetal movement was around 8 pm.       Pt is  38w4d intra-uterine pregnancy.  History obtained. Oriented to room, staff, and plan of care.

## 2024-03-03 NOTE — PROGRESS NOTES
Written and verbal discharge instructions given to patient and spouse, questions answered and verbalized understanding of teaching. Patient discharged ambulatory, with all belonging, accompanied by spouse, undelivered, not in active labor. Patient instructed to continue completing kick counts and to keep her next scheduled appointment. She and her spouse are in agreement with plan at this time.

## 2024-03-03 NOTE — DISCHARGE INSTRUCTIONS
Discharge Instructions    Diet: GDM diet/push fluids  Activity: Normal activity         General Instructions    Call your OB doctor if: Fluid leaking from your vagina;Uterine contractions 10 minutes or closer for 1 to 2 hours;Uterine contractions increasing in intensity and frequency;Decrease in fetal movement;Vaginal bleeding;Temperature greater than 100F;Vaginal or rectal pressure  Early labor comfort measures: Relax, sleep, take a warm bath or shower for 30 minutes or less;Take a walk

## 2024-03-03 NOTE — NST
Nonstress Test   Patient: Elin Espitia    Gestation: 38w5d    NST:       Variability: Moderate           Accelerations: Yes           Decelerations: None            Baseline: 145 BPM           Uterine Irritability: No           Contractions: Irregular                                        Acoustic Stimulator: No           Nonstress Test Interpretation: Reactive           Nonstress Test Second Interpretation: Reactive                     Additional Comments

## 2024-03-04 ENCOUNTER — TELEPHONE (OUTPATIENT)
Dept: OBGYN UNIT | Facility: HOSPITAL | Age: 34
End: 2024-03-04

## 2024-03-08 ENCOUNTER — HOSPITAL ENCOUNTER (OUTPATIENT)
Facility: HOSPITAL | Age: 34
Discharge: HOME | End: 2024-03-08
Attending: OBSTETRICS & GYNECOLOGY | Admitting: OBSTETRICS & GYNECOLOGY
Payer: COMMERCIAL

## 2024-03-08 ENCOUNTER — ANESTHESIA (OUTPATIENT)
Dept: OBGYN UNIT | Facility: HOSPITAL | Age: 34
End: 2024-03-08
Payer: COMMERCIAL

## 2024-03-08 ENCOUNTER — HOSPITAL ENCOUNTER (INPATIENT)
Facility: HOSPITAL | Age: 34
LOS: 2 days | Discharge: HOME | End: 2024-03-10
Attending: OBSTETRICS & GYNECOLOGY | Admitting: OBSTETRICS & GYNECOLOGY
Payer: COMMERCIAL

## 2024-03-08 ENCOUNTER — ANESTHESIA EVENT (OUTPATIENT)
Dept: OBGYN UNIT | Facility: HOSPITAL | Age: 34
End: 2024-03-08
Payer: COMMERCIAL

## 2024-03-08 VITALS
HEIGHT: 65 IN | DIASTOLIC BLOOD PRESSURE: 85 MMHG | RESPIRATION RATE: 15 BRPM | TEMPERATURE: 99 F | BODY MASS INDEX: 25.33 KG/M2 | HEART RATE: 112 BPM | WEIGHT: 152 LBS | SYSTOLIC BLOOD PRESSURE: 133 MMHG

## 2024-03-08 PROBLEM — Z34.90 PREGNANCY (HCC): Status: ACTIVE | Noted: 2024-03-08

## 2024-03-08 LAB
ALBUMIN SERPL-MCNC: 2.7 G/DL (ref 3.4–5)
ALBUMIN/GLOB SERPL: 0.7 {RATIO} (ref 1–2)
ALP LIVER SERPL-CCNC: 259 U/L
ALT SERPL-CCNC: 13 U/L
ANION GAP SERPL CALC-SCNC: 7 MMOL/L (ref 0–18)
ANTIBODY SCREEN: NEGATIVE
AST SERPL-CCNC: 12 U/L (ref 15–37)
BASOPHILS # BLD AUTO: 0.04 X10(3) UL (ref 0–0.2)
BASOPHILS NFR BLD AUTO: 0.4 %
BILIRUB SERPL-MCNC: 0.2 MG/DL (ref 0.1–2)
BUN BLD-MCNC: 7 MG/DL (ref 9–23)
CALCIUM BLD-MCNC: 9.5 MG/DL (ref 8.5–10.1)
CHLORIDE SERPL-SCNC: 105 MMOL/L (ref 98–112)
CO2 SERPL-SCNC: 25 MMOL/L (ref 21–32)
CREAT BLD-MCNC: 0.48 MG/DL
CREAT UR-SCNC: 51.8 MG/DL
EGFRCR SERPLBLD CKD-EPI 2021: 127 ML/MIN/1.73M2 (ref 60–?)
EOSINOPHIL # BLD AUTO: 0.06 X10(3) UL (ref 0–0.7)
EOSINOPHIL NFR BLD AUTO: 0.6 %
ERYTHROCYTE [DISTWIDTH] IN BLOOD BY AUTOMATED COUNT: 15.8 %
FASTING PATIENT GLUCOSE ANSWER: NO
GLOBULIN PLAS-MCNC: 3.9 G/DL (ref 2.8–4.4)
GLUCOSE BLD-MCNC: 93 MG/DL (ref 70–99)
GLUCOSE BLD-MCNC: 96 MG/DL (ref 70–99)
HCT VFR BLD AUTO: 40.3 %
HGB BLD-MCNC: 13.4 G/DL
IMM GRANULOCYTES # BLD AUTO: 0.17 X10(3) UL (ref 0–1)
IMM GRANULOCYTES NFR BLD: 1.6 %
LYMPHOCYTES # BLD AUTO: 1.47 X10(3) UL (ref 1–4)
LYMPHOCYTES NFR BLD AUTO: 13.7 %
MCH RBC QN AUTO: 27.2 PG (ref 26–34)
MCHC RBC AUTO-ENTMCNC: 33.3 G/DL (ref 31–37)
MCV RBC AUTO: 81.7 FL
MONOCYTES # BLD AUTO: 0.61 X10(3) UL (ref 0.1–1)
MONOCYTES NFR BLD AUTO: 5.7 %
NEUTROPHILS # BLD AUTO: 8.35 X10 (3) UL (ref 1.5–7.7)
NEUTROPHILS # BLD AUTO: 8.35 X10(3) UL (ref 1.5–7.7)
NEUTROPHILS NFR BLD AUTO: 78 %
OSMOLALITY SERPL CALC.SUM OF ELEC: 282 MOSM/KG (ref 275–295)
PLATELET # BLD AUTO: 159 10(3)UL (ref 150–450)
POTASSIUM SERPL-SCNC: 4.1 MMOL/L (ref 3.5–5.1)
PROT SERPL-MCNC: 6.6 G/DL (ref 6.4–8.2)
PROT UR-MCNC: 10.7 MG/DL
PROT/CREAT UR-RTO: 0.21
RBC # BLD AUTO: 4.93 X10(6)UL
RH BLOOD TYPE: POSITIVE
SODIUM SERPL-SCNC: 137 MMOL/L (ref 136–145)
T PALLIDUM AB SER QL IA: NONREACTIVE
URATE SERPL-MCNC: 5.2 MG/DL
WBC # BLD AUTO: 10.7 X10(3) UL (ref 4–11)

## 2024-03-08 PROCEDURE — 96360 HYDRATION IV INFUSION INIT: CPT

## 2024-03-08 PROCEDURE — 82947 ASSAY GLUCOSE BLOOD QUANT: CPT | Performed by: OBSTETRICS & GYNECOLOGY

## 2024-03-08 PROCEDURE — 82570 ASSAY OF URINE CREATININE: CPT | Performed by: OBSTETRICS & GYNECOLOGY

## 2024-03-08 PROCEDURE — 59025 FETAL NON-STRESS TEST: CPT

## 2024-03-08 PROCEDURE — 86850 RBC ANTIBODY SCREEN: CPT | Performed by: OBSTETRICS & GYNECOLOGY

## 2024-03-08 PROCEDURE — 10907ZC DRAINAGE OF AMNIOTIC FLUID, THERAPEUTIC FROM PRODUCTS OF CONCEPTION, VIA NATURAL OR ARTIFICIAL OPENING: ICD-10-PCS | Performed by: OBSTETRICS & GYNECOLOGY

## 2024-03-08 PROCEDURE — 99213 OFFICE O/P EST LOW 20 MIN: CPT

## 2024-03-08 PROCEDURE — 84550 ASSAY OF BLOOD/URIC ACID: CPT | Performed by: OBSTETRICS & GYNECOLOGY

## 2024-03-08 PROCEDURE — 86780 TREPONEMA PALLIDUM: CPT | Performed by: OBSTETRICS & GYNECOLOGY

## 2024-03-08 PROCEDURE — 84156 ASSAY OF PROTEIN URINE: CPT | Performed by: OBSTETRICS & GYNECOLOGY

## 2024-03-08 PROCEDURE — 85025 COMPLETE CBC W/AUTO DIFF WBC: CPT | Performed by: OBSTETRICS & GYNECOLOGY

## 2024-03-08 PROCEDURE — 80053 COMPREHEN METABOLIC PANEL: CPT | Performed by: OBSTETRICS & GYNECOLOGY

## 2024-03-08 PROCEDURE — 86900 BLOOD TYPING SEROLOGIC ABO: CPT | Performed by: OBSTETRICS & GYNECOLOGY

## 2024-03-08 PROCEDURE — 99214 OFFICE O/P EST MOD 30 MIN: CPT

## 2024-03-08 PROCEDURE — 86901 BLOOD TYPING SEROLOGIC RH(D): CPT | Performed by: OBSTETRICS & GYNECOLOGY

## 2024-03-08 RX ORDER — SODIUM CHLORIDE, SODIUM LACTATE, POTASSIUM CHLORIDE, CALCIUM CHLORIDE 600; 310; 30; 20 MG/100ML; MG/100ML; MG/100ML; MG/100ML
INJECTION, SOLUTION INTRAVENOUS CONTINUOUS
Status: DISCONTINUED | OUTPATIENT
Start: 2024-03-08 | End: 2024-03-09 | Stop reason: HOSPADM

## 2024-03-08 RX ORDER — ACETAMINOPHEN 500 MG
500 TABLET ORAL EVERY 6 HOURS PRN
Status: DISCONTINUED | OUTPATIENT
Start: 2024-03-08 | End: 2024-03-09 | Stop reason: HOSPADM

## 2024-03-08 RX ORDER — SODIUM CHLORIDE, SODIUM LACTATE, POTASSIUM CHLORIDE, CALCIUM CHLORIDE 600; 310; 30; 20 MG/100ML; MG/100ML; MG/100ML; MG/100ML
INJECTION, SOLUTION INTRAVENOUS CONTINUOUS
Status: DISCONTINUED | OUTPATIENT
Start: 2024-03-08 | End: 2024-03-08

## 2024-03-08 RX ORDER — CITRIC ACID/SODIUM CITRATE 334-500MG
30 SOLUTION, ORAL ORAL AS NEEDED
Status: DISCONTINUED | OUTPATIENT
Start: 2024-03-08 | End: 2024-03-09 | Stop reason: HOSPADM

## 2024-03-08 RX ORDER — BUPIVACAINE HYDROCHLORIDE 2.5 MG/ML
INJECTION, SOLUTION EPIDURAL; INFILTRATION; INTRACAUDAL AS NEEDED
Status: DISCONTINUED | OUTPATIENT
Start: 2024-03-08 | End: 2024-03-09 | Stop reason: SURG

## 2024-03-08 RX ORDER — IBUPROFEN 600 MG/1
600 TABLET ORAL ONCE AS NEEDED
Status: DISCONTINUED | OUTPATIENT
Start: 2024-03-08 | End: 2024-03-09 | Stop reason: HOSPADM

## 2024-03-08 RX ORDER — ONDANSETRON 2 MG/ML
4 INJECTION INTRAMUSCULAR; INTRAVENOUS EVERY 6 HOURS PRN
Status: DISCONTINUED | OUTPATIENT
Start: 2024-03-08 | End: 2024-03-09 | Stop reason: HOSPADM

## 2024-03-08 RX ORDER — LIDOCAINE HYDROCHLORIDE AND EPINEPHRINE 15; 5 MG/ML; UG/ML
INJECTION, SOLUTION EPIDURAL AS NEEDED
Status: DISCONTINUED | OUTPATIENT
Start: 2024-03-08 | End: 2024-03-09 | Stop reason: SURG

## 2024-03-08 RX ORDER — NALBUPHINE HYDROCHLORIDE 10 MG/ML
2.5 INJECTION, SOLUTION INTRAMUSCULAR; INTRAVENOUS; SUBCUTANEOUS
Status: DISCONTINUED | OUTPATIENT
Start: 2024-03-08 | End: 2024-03-09

## 2024-03-08 RX ORDER — ACETAMINOPHEN 500 MG
1000 TABLET ORAL EVERY 6 HOURS PRN
Status: DISCONTINUED | OUTPATIENT
Start: 2024-03-08 | End: 2024-03-09 | Stop reason: HOSPADM

## 2024-03-08 RX ORDER — TERBUTALINE SULFATE 1 MG/ML
0.25 INJECTION, SOLUTION SUBCUTANEOUS AS NEEDED
Status: DISCONTINUED | OUTPATIENT
Start: 2024-03-08 | End: 2024-03-09 | Stop reason: HOSPADM

## 2024-03-08 RX ORDER — BUPIVACAINE HCL/0.9 % NACL/PF 0.25 %
5 PLASTIC BAG, INJECTION (ML) EPIDURAL AS NEEDED
Status: DISCONTINUED | OUTPATIENT
Start: 2024-03-08 | End: 2024-03-09

## 2024-03-08 RX ORDER — DEXTROSE, SODIUM CHLORIDE, SODIUM LACTATE, POTASSIUM CHLORIDE, AND CALCIUM CHLORIDE 5; .6; .31; .03; .02 G/100ML; G/100ML; G/100ML; G/100ML; G/100ML
INJECTION, SOLUTION INTRAVENOUS AS NEEDED
Status: DISCONTINUED | OUTPATIENT
Start: 2024-03-08 | End: 2024-03-09 | Stop reason: HOSPADM

## 2024-03-08 RX ADMIN — BUPIVACAINE HYDROCHLORIDE 5 ML: 2.5 INJECTION, SOLUTION EPIDURAL; INFILTRATION; INTRACAUDAL at 20:15:00

## 2024-03-08 RX ADMIN — LIDOCAINE HYDROCHLORIDE AND EPINEPHRINE 3 ML: 15; 5 INJECTION, SOLUTION EPIDURAL at 20:14:00

## 2024-03-08 NOTE — H&P
[facility]  History & Physical    SUBJECTIVE:    Elin Espitia is a 33 year old  at 39w3d who presents with decreased fetal movement.    Obstetric History:    OB History    Para Term  AB Living   3 1 1 0 1 1   SAB IAB Ectopic Multiple Live Births   0 1 0 0 1      # Outcome Date GA Lbr Jonathan/2nd Weight Sex Delivery Anes PTL Lv   3 Current            2 IAB 2015           1 Term 14 40w0d  7 lb 7.9 oz (3.4 kg) F CS-Unspec   YANI     Past Medical History:   Past Medical History:   Diagnosis Date    Breast mass, right 05/10/2018    Comments: Movable large right upper outer quadrant breast mass 6-7 cm diameter ×3and at 9:00 areolar border 2 cm palpable smooth mass movable mild tenderness.  Fibroadenoma, Consult: Leydi gerard.    Gestational diabetes (HCC)     History of lower leg fracture     Iron deficiency anemia secondary to blood loss (chronic) 2018     Past Surgical History:    Past Surgical History:   Procedure Laterality Date    APPENDECTOMY  2003      2014     DELIVERY ONLY  2014    AMIRAH LOCALIZATION WIRE 1 SITE RIGHT (CPT=19281)  '08    BENIGN     Family History:    Family History   Problem Relation Age of Onset    No Known Problems Mother     No Known Problems Father     Diabetes Maternal Grandmother     No Known Problems Maternal Grandfather     No Known Problems Paternal Grandmother     Diabetes Paternal Grandfather     Stroke Paternal Grandfather     No Known Problems Daughter     Cancer Neg     Blood Disorder Neg      Social History:    Social History     Tobacco Use    Smoking status: Never    Smokeless tobacco: Never   Substance Use Topics    Alcohol use: No     Comment: No history of excessive use     Home Meds:     No current facility-administered medications on file prior to encounter.     Current Outpatient Medications on File Prior to Encounter   Medication Sig Dispense Refill    prenatal vitamin with DHA 27-0.8-228 MG Oral Cap  Take 1 capsule by mouth daily.      Ferrous Fumarate 325 (106 Fe) MG Oral Tab Take by mouth. (Patient not taking: Reported on 3/2/2024)       Allergies:   No Known Allergies    OBJECTIVE:    Temp: 98.6 °F (37 °C)  Pulse: 112  Resp: 15  BP: 133/85    abd gravid, NT  FHTs baseline 150s, moderate variability, accels appreciated,?10 sec v-shaped variables   NST R  tocos Irreg contractions  US Cephalic, JOSETTE 14  SVE 1 cm/50% per RN exam    ASSESSMENT:  39w3d  Previous  delivery  PLAN:  Labor instructions reviewed  Routine prenatal care

## 2024-03-08 NOTE — PROGRESS NOTES
Pt is d/c'd to home in stable condition, not in active labor. Both written and verbal instructions provided. Questions answered. Pt verbalizes understanding and agreement.

## 2024-03-08 NOTE — PROGRESS NOTES
Pt is a  at 39 3/7 wk iup who is brought to room triage-2 for c/o decreased FM. Pt reports FM but less than her normal. Pt reports uc's beginning this am that are approx 10 minutes apart. pT denies any LOF or VB. EFm tested and applied. POC discussed and questions answered.

## 2024-03-08 NOTE — PROGRESS NOTES
03/08/24 1350   Nonstress Test   Multiple NST? No   Variability 6-25 BPM   Decelerations Variable   Accelerations Yes   Acoustic Stimulator Yes   Baseline 145 BPM   Uterine Irritability Yes   Contractions Irregular   Interpretation   Nonstress Test Interpretation Reactive   Nonstress Test Second Interpretation   (reviewed in person by Dr PETTIT. willy u/s performed)

## 2024-03-08 NOTE — DISCHARGE INSTRUCTIONS
Discharge Instructions    Diet: Diabetic Diet  Activity: Normal activity         General Instructions    Call your OB doctor if: Fluid leaking from your vagina;Uterine contractions 5minutes or closer for 1 to 2 hours;Uterine contractions increasing in intensity and frequency;Decrease in fetal movement;Vaginal bleeding;Temperature greater than 100F;Vaginal or rectal pressure

## 2024-03-09 PROCEDURE — 0KQM0ZZ REPAIR PERINEUM MUSCLE, OPEN APPROACH: ICD-10-PCS | Performed by: OBSTETRICS & GYNECOLOGY

## 2024-03-09 RX ORDER — ACETAMINOPHEN 500 MG
1000 TABLET ORAL EVERY 6 HOURS PRN
Status: DISCONTINUED | OUTPATIENT
Start: 2024-03-09 | End: 2024-03-10

## 2024-03-09 RX ORDER — SIMETHICONE 80 MG
80 TABLET,CHEWABLE ORAL 3 TIMES DAILY PRN
Status: DISCONTINUED | OUTPATIENT
Start: 2024-03-09 | End: 2024-03-10

## 2024-03-09 RX ORDER — DOCUSATE SODIUM 100 MG/1
100 CAPSULE, LIQUID FILLED ORAL
Status: DISCONTINUED | OUTPATIENT
Start: 2024-03-09 | End: 2024-03-10

## 2024-03-09 RX ORDER — IBUPROFEN 600 MG/1
600 TABLET ORAL EVERY 6 HOURS
Status: DISCONTINUED | OUTPATIENT
Start: 2024-03-09 | End: 2024-03-10

## 2024-03-09 RX ORDER — BISACODYL 10 MG
10 SUPPOSITORY, RECTAL RECTAL ONCE AS NEEDED
Status: DISCONTINUED | OUTPATIENT
Start: 2024-03-09 | End: 2024-03-10

## 2024-03-09 RX ORDER — ACETAMINOPHEN 500 MG
500 TABLET ORAL EVERY 6 HOURS PRN
Status: DISCONTINUED | OUTPATIENT
Start: 2024-03-09 | End: 2024-03-10

## 2024-03-09 NOTE — PROGRESS NOTES
Patient up to bathroom with assist x 2.  Unable to void at this time. Patient transferred to mother/baby room 1117 per wheelchair in stable condition with baby and personal belongings.  Accompanied by significant other and staff.  Report given to mother/baby RN.

## 2024-03-09 NOTE — H&P
[facility]  History & Physical    SUBJECTIVE:    Elin Espitia is a 33 year old  at 39w3d who presents for TOLAC.    Obstetric History:    OB History    Para Term  AB Living   3 1 1 0 1 1   SAB IAB Ectopic Multiple Live Births   0 1 0 0 1      # Outcome Date GA Lbr Jonathan/2nd Weight Sex Delivery Anes PTL Lv   3 Current            2 IAB 2015           1 Term 14 40w0d  7 lb 7.9 oz (3.4 kg) F CS-Unspec   YANI     Past Medical History:   Past Medical History:   Diagnosis Date    Breast mass, right 05/10/2018    Comments: Movable large right upper outer quadrant breast mass 6-7 cm diameter ×3and at 9:00 areolar border 2 cm palpable smooth mass movable mild tenderness.  Fibroadenoma, Consult: Leydi gerard.    Gestational diabetes (HCC)     History of lower leg fracture     Iron deficiency anemia secondary to blood loss (chronic) 2018     Past Surgical History:    Past Surgical History:   Procedure Laterality Date    APPENDECTOMY        2014     DELIVERY ONLY  2014    AMIRAH LOCALIZATION WIRE 1 SITE RIGHT (CPT=19281)  '08    BENIGN     Family History:    Family History   Problem Relation Age of Onset    No Known Problems Mother     No Known Problems Father     Diabetes Maternal Grandmother     No Known Problems Maternal Grandfather     No Known Problems Paternal Grandmother     Diabetes Paternal Grandfather     Stroke Paternal Grandfather     No Known Problems Daughter     Cancer Neg     Blood Disorder Neg      Social History:    Social History     Tobacco Use    Smoking status: Never    Smokeless tobacco: Never   Substance Use Topics    Alcohol use: No     Comment: No history of excessive use     Home Meds:     No current facility-administered medications on file prior to encounter.     Current Outpatient Medications on File Prior to Encounter   Medication Sig Dispense Refill    prenatal vitamin with DHA 27-0.8-228 MG Oral Cap Take 1 capsule by mouth  daily.      Ferrous Fumarate 325 (106 Fe) MG Oral Tab Take by mouth. (Patient not taking: Reported on 3/2/2024)       Allergies:   No Known Allergies    OBJECTIVE:    Temp: 98 °F (36.7 °C)  Pulse: 95  Resp: 18  BP: 138/89    abd gravid, NT  FHTs baseline 150s, moderate variability, accels appreciated, no decels  tocos q5 minutes  SVE 3 cm/80%/-2 sta, cephalic    Lab Review:     GBS neg    ASSESSMENT:  39w3d  Previous  for failure to progress/OP/declines labor in [facility]   Desires TOLAC  A1GDM    PLAN:  Admit  Expectant mgt  Discussed risks/benefits of TOLAC including but not limited to uterine rupture

## 2024-03-09 NOTE — PROGRESS NOTES
SVE: 10/100/0-+1  FHT cat 1  Plan to start pushing now  If patient unable to push due to dense epidural, will plan to expectantly manage for 30 minutes before restarting pushing efforts      Kimberly Lacy MD

## 2024-03-09 NOTE — PLAN OF CARE
Problem: BIRTH - VAGINAL/ SECTION  Goal: Fetal and maternal status remain reassuring during the birth process  Description: INTERVENTIONS:  - Monitor vital signs  - Monitor fetal heart rate  - Monitor uterine activity  - Monitor labor progression (vaginal delivery)  - DVT prophylaxis (C/S delivery)  - Surgical antibiotic prophylaxis (C/S delivery)  Outcome: Progressing     Problem: PAIN - ADULT  Goal: Verbalizes/displays adequate comfort level or patient's stated pain goal  Description: INTERVENTIONS:  - Encourage pt to monitor pain and request assistance  - Assess pain using appropriate pain scale  - Administer analgesics based on type and severity of pain and evaluate response  - Implement non-pharmacological measures as appropriate and evaluate response  - Consider cultural and social influences on pain and pain management  - Manage/alleviate anxiety  - Utilize distraction and/or relaxation techniques  - Monitor for opioid side effects  - Notify MD/LIP if interventions unsuccessful or patient reports new pain  - Anticipate increased pain with activity and pre-medicate as appropriate  Outcome: Progressing     Problem: ANXIETY  Goal: Will report anxiety at manageable levels  Description: INTERVENTIONS:  - Administer medication as ordered  - Teach and rehearse alternative coping skills  - Provide emotional support with 1:1 interaction with staff  Outcome: Progressing     Problem: Diabetes/Glucose Control  Goal: Glucose maintained within prescribed range  Description: INTERVENTIONS:  - Monitor Blood Glucose as ordered  - Assess for signs and symptoms of hyperglycemia and hypoglycemia  - Administer ordered medications to maintain glucose within target range  - Assess barriers to adequate nutritional intake and initiate nutrition consult as needed  - Instruct patient on self management of diabetes  Outcome: Progressing     Problem: Patient/Family Goals  Goal: Patient/Family Long Term Goal  Description:  Patient's Long Term Goal: adequate pain mangement    Interventions:  -   - See additional Care Plan goals for specific interventions  Outcome: Progressing  Goal: Patient/Family Short Term Goal  Description: Patient's Short Term Goal: progress towards     Interventions:   -   - See additional Care Plan goals for specific interventions  Outcome: Progressing

## 2024-03-09 NOTE — L&D DELIVERY NOTE
Nupur, Girl [[id number]]      Labor Events     labor?: No   steroids?: None  Rupture date/time: 3/8/2024 2128     Rupture type: AROM  Fluid color: Clear  Labor type: Spontaneous Onset of Labor  Augmentation: AROM  Indications for augmentation: Ineffective Contraction Pattern       Labor Event Times    Labor onset date/time: 3/8/2024 1700  Dilation complete date/time: 3/8/2024 2331  Start pushing date/time: 3/8/2024 2334       [place] Presentation    Presentation: Vertex  Position: Right Occiput Anterior       Operative Delivery    Operative Vaginal Delivery: No                      Shoulder Dystocia    Shoulder Dystocia: No             Anesthesia    Method: Epidural               Delivery      Head delivery date/time: 3/9/2024 01:16:36   Delivery date/time:  3/9/24 01:16:45   Delivery type: Vaginal birth after caesarian    Details:     Delivery location: delivery room       Delivery Providers    Delivering Clinician: Kimberly Lacy MD   Delivery personnel:  Provider Role   Shannon Cox RN Baby Nurse   Ricarda Olivera RN Delivery Nurse             Cord    Vessels: 3 Vessels  Complications: Nuchal, Body cord  # of loops: 1  Timed cord clamping: Yes  Time in sec: 30  Cord blood disposition: to lab  Gases sent?: No       Resuscitation    Method: None        Measurements      Weight: 3550 g 7 lb 13.2 oz Length: 50.8 cm     Head circum.: 32 cm Chest circum.: 31 cm          Abdominal circum.: 30 cm           Placenta    Date/time: 3/9/2024 0121  Removal: Spontaneous  Appearance: Intact  Disposition: held for future pathology       Apgars    Living status: Living   Apgar Scoring Key:    0 1 2    Skin color Blue or pale Acrocyanotic Completely pink    Heart rate Absent <100 bpm >100 bpm    Reflex irritability No response Grimace Cry or active withdrawal    Muscle tone Limp Some flexion Active motion    Respiratory effort Absent Weak cry; hypoventilation Good, crying               1 Minute:  5 Minute:  10 Minute:  15 Minute:  20 Minute:      Skin color: 1  1       Heart rate: 2  2       Reflex irritablity: 2  2       Muscle tone: 2  2       Respiratory effort: 2  2       Total: 9  9          [place] disposition: with mother       Skin to Skin    No data filed       Vaginal Count    Initial count RN: Ricarda Olivera RN  Initial count Tech: Isabell Martinez    Initial counts 11   0    Final counts               Lacerations    Episiotomy: None  Perineal lacerations: 2nd Repaired?: Yes     Vaginal laceration?: No      Cervical laceration?: No    Clitoral laceration?: No                                                                          Vaginal Delivery Note          Elin Espitia Patient Status:  Inpatient    1990 MRN [id number]   [facility] LABOR & DELIVERY Attending Andie Carbajal MD   Hosp Day # 1 PCP Shefali Grossman DO       Delivery: Normal spontaneous vaginal delivery  Surgeon: Kimberly Lacy MD  Anesthesia: Epidural  QBL: pending  Infant: female  Apgars: 9/9  Weight: 3550g    Brief history and labor course:  Ms. Elin Espitia is a 33 year old  at 39wk4d. She presented to labor and delivery for labor. Her pregnancy was complicated by A1GDM and prior  section x1. On exam in triage she was noted to be 3/80/-2. She was admitted for labor. She had AROM and progressed to complete dilation.     After pushing for 1 hour and 44 minutes, at 0116 patient delivered a viable female , wt pending, apgars of 9 (1 min) and 9 (5 min). The fetal vertex delivered spontaneously. Baby was checked for nuchal. Nuchal cord x1 was palpated and reduced. The anterior shoulder delivered atraumatically with maternal expulsive forces and the assistance of gentle downward traction. The posterior shoulder delivered with maternal expulsive forces and the assistance of upward traction. The remainder of the fetus delivered  spontaneously.     Upon delivery, the infant was placed on the mothers abdomen and the cord was clamped and cut. Delayed cord clamping was performed.  The infant was noted to cry spontaneously and was moving all extremities appropriately. There was no evidence for injury. Awaiting nurse resuscitators evaluated the . In the immediate post-partum, 30 units of IV pitocin was administered, and the uterus was noted to contract down well with massage and pitocin. The placenta delivered spontaneously at 0121 and was noted to have a centrally inserted 3 vessel cord.     The vagina, cervix, perineum, and rectum were inspected and there was noted to be a 2nd degree laceration. A rectal exam was performed and confirmed intact rectal mucosa. The external anal sphincter was re-enforced with 2-0 vicryl. The second degree was repaired in the standard fashion with 3-0 vicryl. Good hemostasis was achieved.    At the conclusion of the procedure, all needle, sponge, and instrument counts were noted to be correct. Patient tolerated the procedure well and was allowed to recover in labor and delivery room with family and  before being transferred to the post-partum floor.     Complications:  None    Kimberly Lacy MD

## 2024-03-09 NOTE — ANESTHESIA PROCEDURE NOTES
Labor Analgesia    Date/Time: 3/8/2024 8:08 PM    Performed by: Toni Yoo MD  Authorized by: Toni Yoo MD      General Information and Staff    Start Time:  3/8/2024 8:08 PM  End Time:  3/8/2024 8:14 PM  Anesthesiologist:  Toni Yoo MD  Performed by:  Anesthesiologist  Patient Location:  OB  Site Identification: surface landmarks    Reason for Block: labor epidural    Preanesthetic Checklist: patient identified, IV checked, risks and benefits discussed, monitors and equipment checked, pre-op evaluation, timeout performed, IV bolus, anesthesia consent and sterile technique used      Procedure Details    Patient Position:  Sitting  Prep: ChloraPrep    Monitoring:  Heart rate and continuous pulse ox  Approach:  Midline    Epidural Needle    Injection Technique:  CAMMY saline  Needle Type:  Tuohy  Needle Gauge:  17 G  Needle Length:  3.375 in  Needle Insertion Depth:  5  Location:  L3-4    Spinal Needle      Catheter    Catheter Type:  End hole  Catheter Size:  19 G  Test Dose:  Negative    Assessment    Sensory Level:  T10    Additional Comments

## 2024-03-09 NOTE — PLAN OF CARE
Problem: BIRTH - VAGINAL/ SECTION  Goal: Fetal and maternal status remain reassuring during the birth process  Description: INTERVENTIONS:  - Monitor vital signs  - Monitor fetal heart rate  - Monitor uterine activity  - Monitor labor progression (vaginal delivery)  - DVT prophylaxis (C/S delivery)  - Surgical antibiotic prophylaxis (C/S delivery)  3/9/2024 0301 by Ricarda Olivera RN  Outcome: Completed  3/8/2024 2200 by Ricarda Olivera RN  Outcome: Progressing     Problem: PAIN - ADULT  Goal: Verbalizes/displays adequate comfort level or patient's stated pain goal  Description: INTERVENTIONS:  - Encourage pt to monitor pain and request assistance  - Assess pain using appropriate pain scale  - Administer analgesics based on type and severity of pain and evaluate response  - Implement non-pharmacological measures as appropriate and evaluate response  - Consider cultural and social influences on pain and pain management  - Manage/alleviate anxiety  - Utilize distraction and/or relaxation techniques  - Monitor for opioid side effects  - Notify MD/LIP if interventions unsuccessful or patient reports new pain  - Anticipate increased pain with activity and pre-medicate as appropriate  3/9/2024 0301 by Ricarda Olivera RN  Outcome: Progressing  3/8/2024 2200 by Ricarda Olivera RN  Outcome: Progressing     Problem: ANXIETY  Goal: Will report anxiety at manageable levels  Description: INTERVENTIONS:  - Administer medication as ordered  - Teach and rehearse alternative coping skills  - Provide emotional support with 1:1 interaction with staff  3/9/2024 0301 by Ricarda Olivera RN  Outcome: Progressing  3/8/2024 2200 by Ricarda Olivera RN  Outcome: Progressing     Problem: Diabetes/Glucose Control  Goal: Glucose maintained within prescribed range  Description: INTERVENTIONS:  - Monitor Blood Glucose as ordered  - Assess for signs and symptoms of hyperglycemia and hypoglycemia  - Administer  ordered medications to maintain glucose within target range  - Assess barriers to adequate nutritional intake and initiate nutrition consult as needed  - Instruct patient on self management of diabetes  3/9/2024 0301 by Ricarda Olivera RN  Outcome: Progressing  3/8/2024 2200 by Ricarda Olivera RN  Outcome: Progressing     Problem: Patient/Family Goals  Goal: Patient/Family Long Term Goal  Description: Patient's Long Term Goal: Uncomplicated vaginal delivery    Interventions:  VS per protocol  I&O  Ice chips and sips as tolerated  EFM per protocol  Maintain IV as ordered  Antibiotics as needed per protocol  Informed consent      - See additional Care Plan goals for specific interventions  3/9/2024 0301 by Ricarda Olivera RN  Outcome: Completed  3/8/2024 2200 by Ricarda Olivera RN  Outcome: Progressing  Goal: Patient/Family Short Term Goal  Description: Patient's Short Term Goal: Adequate pain control with delivery of infant  Interventions:  Pain assessment scores as ordered  Patient scores pain a \"3\" or less  Multidisciplinary care   Nonpharmacologic comfort measures        I  - See additional Care Plan goals for specific interventions  3/9/2024 0301 by Ricarda Olivera, RN  Outcome: Completed  3/8/2024 2200 by Ricarda Olivera, RN  Outcome: Progressing

## 2024-03-09 NOTE — PROGRESS NOTES
Patient admitted to Mother Baby unit at this time. Patient in stable condition. ID bands matched with infant. Oriented to room. Instructed to call for help first time getting out of bed. Call light within reach.

## 2024-03-09 NOTE — PROGRESS NOTES
Patient comfortable with epidural now  AROM, clear fluid  SVE: 5/90/-1  Occasional variable deceleration noted, otherwise moderate variability  Will continue expectant management at this time  If no change in two hours, will plan for pitocin augmentation  All questions answered  Elevated BP noted prior to epidural and shortly after; PIH labs pending, P/C ration 0.21; patient without any s/s of pre-eclampsia at this time  Continue to monitor closely    Kimberly Lacy MD

## 2024-03-09 NOTE — PROGRESS NOTES
Postpartum Day 0    Pt without complaints.    Temp: 98.3 °F (36.8 °C)  Pulse: 89  Resp: 16  BP: 121/79  abd  soft, NT, ND, fundus firm below umbilicus  perineum NL lochia  extr  trace edema, no calf tenderness  Recent Labs   Lab 24  1834   RBC 4.93   HGB 13.4   HCT 40.3   MCV 81.7   MCH 27.2   MCHC 33.3   RDW 15.8   NEPRELIM 8.35*   WBC 10.7   .0       Impression: PPD# 0 s/p   Plan:  Continue postpartum care.    Circumcision discussed.     Anticipate discharge home tomorrow.

## 2024-03-09 NOTE — PLAN OF CARE
Problem: BIRTH - VAGINAL/ SECTION  Goal: Fetal and maternal status remain reassuring during the birth process  Description: INTERVENTIONS:  - Monitor vital signs  - Monitor fetal heart rate  - Monitor uterine activity  - Monitor labor progression (vaginal delivery)  - DVT prophylaxis (C/S delivery)  - Surgical antibiotic prophylaxis (C/S delivery)  Outcome: Progressing     Problem: PAIN - ADULT  Goal: Verbalizes/displays adequate comfort level or patient's stated pain goal  Description: INTERVENTIONS:  - Encourage pt to monitor pain and request assistance  - Assess pain using appropriate pain scale  - Administer analgesics based on type and severity of pain and evaluate response  - Implement non-pharmacological measures as appropriate and evaluate response  - Consider cultural and social influences on pain and pain management  - Manage/alleviate anxiety  - Utilize distraction and/or relaxation techniques  - Monitor for opioid side effects  - Notify MD/LIP if interventions unsuccessful or patient reports new pain  - Anticipate increased pain with activity and pre-medicate as appropriate  Outcome: Progressing     Problem: ANXIETY  Goal: Will report anxiety at manageable levels  Description: INTERVENTIONS:  - Administer medication as ordered  - Teach and rehearse alternative coping skills  - Provide emotional support with 1:1 interaction with staff  Outcome: Progressing     Problem: Diabetes/Glucose Control  Goal: Glucose maintained within prescribed range  Description: INTERVENTIONS:  - Monitor Blood Glucose as ordered  - Assess for signs and symptoms of hyperglycemia and hypoglycemia  - Administer ordered medications to maintain glucose within target range  - Assess barriers to adequate nutritional intake and initiate nutrition consult as needed  - Instruct patient on self management of diabetes  Outcome: Progressing     Problem: Patient/Family Goals  Goal: Patient/Family Long Term Goal  Description:  Patient's Long Term Goal: Uncomplicated vaginal delivery    Interventions:  VS per protocol  I&O  Ice chips and sips as tolerated  EFM per protocol  Maintain IV as ordered  Antibiotics as needed per protocol  Informed consent      - See additional Care Plan goals for specific interventions  Outcome: Progressing  Goal: Patient/Family Short Term Goal  Description: Patient's Short Term Goal: Adequate pain control with delivery of infant  Interventions:  Pain assessment scores as ordered  Patient scores pain a \"3\" or less  Multidisciplinary care   Nonpharmacologic comfort measures        I  - See additional Care Plan goals for specific interventions  Outcome: Progressing

## 2024-03-09 NOTE — ANESTHESIA PREPROCEDURE EVALUATION
PRE-OP EVALUATION    Patient Name: Elin Espitia    Admit Diagnosis: Pregnancy  Pregnancy (HCC)    Pre-op Diagnosis: * No pre-op diagnosis entered *     SECTION    Anesthesia Procedure: LABOR ANALGESIA    Surgeon(s) and Role:     * Rebeka Craft MD - Primary    Pre-op vitals reviewed.  Temp: 98 °F (36.7 °C)  Pulse: 95  Resp: 18  BP: 138/89     There is no height or weight on file to calculate BMI.    Current medications reviewed.  Hospital Medications:   lactated ringers infusion   Intravenous Continuous    dextrose in lactated ringers 5% infusion   Intravenous PRN    lactated ringers IV bolus 500 mL  500 mL Intravenous PRN    acetaminophen (Tylenol Extra Strength) tab 500 mg  500 mg Oral Q6H PRN    acetaminophen (Tylenol Extra Strength) tab 1,000 mg  1,000 mg Oral Q6H PRN    ibuprofen (Motrin) tab 600 mg  600 mg Oral Once PRN    ondansetron (Zofran) 4 MG/2ML injection 4 mg  4 mg Intravenous Q6H PRN    oxyTOCIN in sodium chloride 0.9% (Pitocin) 30 Units/500mL infusion premix  62.5-900 ciera-units/min Intravenous Continuous    terbutaline (Brethine) 1 MG/ML injection 0.25 mg  0.25 mg Subcutaneous PRN    sodium citrate-citric acid (Bicitra) 500-334 MG/5ML oral solution 30 mL  30 mL Oral PRN    lactated ringers IV bolus 1,000 mL  1,000 mL Intravenous Once    fentaNYL-bupivacaine 2 mcg/mL-0.125% in sodium chloride 0.9% 100 mL EPIDURAL infusion premix  12 mL/hr Epidural Continuous    fentaNYL (Sublimaze) 50 mcg/mL injection 100 mcg  100 mcg Epidural Once    EPHEDrine (PF) 25 MG/5 ML injection 5 mg  5 mg Intravenous PRN    nalbuphine (Nubain) 10 mg/mL injection 2.5 mg  2.5 mg Intravenous Q15 Min PRN       Outpatient Medications:     Medications Prior to Admission   Medication Sig Dispense Refill Last Dose    prenatal vitamin with DHA 27-0.8-228 MG Oral Cap Take 1 capsule by mouth daily.       Ferrous Fumarate 325 (106 Fe) MG Oral Tab Take by mouth. (Patient not taking: Reported on 3/2/2024)           Allergies: Patient has no known allergies.      Anesthesia Evaluation    Patient summary reviewed.    Anesthetic Complications  (-) history of anesthetic complications         GI/Hepatic/Renal    Negative GI/hepatic/renal ROS.                             Cardiovascular    Negative cardiovascular ROS.    Exercise tolerance: good     MET: >4                                           Endo/Other      (+) diabetes                            Pulmonary    Negative pulmonary ROS.                       Neuro/Psych    Negative neuro/psych ROS.                                  Past Surgical History:   Procedure Laterality Date    APPENDECTOMY  2003      2014     DELIVERY ONLY  2014    AMIRAH LOCALIZATION WIRE 1 SITE RIGHT (CPT=19281)  '08    BENIGN     Social History     Socioeconomic History    Marital status:    Tobacco Use    Smoking status: Never    Smokeless tobacco: Never   Vaping Use    Vaping Use: Never used   Substance and Sexual Activity    Alcohol use: No     Comment: No history of excessive use    Drug use: No   Other Topics Concern    Caffeine Concern Yes     Comment: 1 cup a day    Exercise Yes     Comment: Few times in a month    Seat Belt Yes    Special Diet No    Stress Concern No    Weight Concern No     History   Drug Use No     Available pre-op labs reviewed.  Lab Results   Component Value Date    WBC 10.7 2024    RBC 4.93 2024    HGB 13.4 2024    HCT 40.3 2024    MCV 81.7 2024    MCH 27.2 2024    MCHC 33.3 2024    RDW 15.8 2024    .0 2024               Airway      Mallampati: II  Mouth opening: 3 FB  TM distance: 4 - 6 cm  Neck ROM: full Cardiovascular      Rhythm: regular  Rate: normal     Dental  Comment: No loose teeth reported by patient           Pulmonary      Breath sounds clear to auscultation bilaterally.               Other findings              ASA: 2   Plan: epidural  NPO status verified and      Post-procedure pain management plan discussed with surgeon and patient.  Surgeon requests: regional block  Comment: The risks and benefits of neuraxial anesthesia have been explained to the patient as outlined in the anesthesia consent.  Risks include but are not limited to: bleeding, infection, nerve damage, paresthesias, headaches, and incomplete block.  The consent was signed without further questions.   Plan/risks discussed with: patient                Present on Admission:  **None**

## 2024-03-10 VITALS
OXYGEN SATURATION: 95 % | DIASTOLIC BLOOD PRESSURE: 82 MMHG | RESPIRATION RATE: 16 BRPM | HEART RATE: 78 BPM | SYSTOLIC BLOOD PRESSURE: 123 MMHG | TEMPERATURE: 98 F

## 2024-03-10 LAB
BASOPHILS # BLD AUTO: 0.05 X10(3) UL (ref 0–0.2)
BASOPHILS NFR BLD AUTO: 0.4 %
EOSINOPHIL # BLD AUTO: 0.21 X10(3) UL (ref 0–0.7)
EOSINOPHIL NFR BLD AUTO: 1.7 %
ERYTHROCYTE [DISTWIDTH] IN BLOOD BY AUTOMATED COUNT: 15.9 %
HCT VFR BLD AUTO: 32.6 %
HGB BLD-MCNC: 10.6 G/DL
IMM GRANULOCYTES # BLD AUTO: 0.16 X10(3) UL (ref 0–1)
IMM GRANULOCYTES NFR BLD: 1.3 %
LYMPHOCYTES # BLD AUTO: 2.37 X10(3) UL (ref 1–4)
LYMPHOCYTES NFR BLD AUTO: 19.7 %
MCH RBC QN AUTO: 27.1 PG (ref 26–34)
MCHC RBC AUTO-ENTMCNC: 32.5 G/DL (ref 31–37)
MCV RBC AUTO: 83.4 FL
MONOCYTES # BLD AUTO: 0.57 X10(3) UL (ref 0.1–1)
MONOCYTES NFR BLD AUTO: 4.7 %
NEUTROPHILS # BLD AUTO: 8.68 X10 (3) UL (ref 1.5–7.7)
NEUTROPHILS # BLD AUTO: 8.68 X10(3) UL (ref 1.5–7.7)
NEUTROPHILS NFR BLD AUTO: 72.2 %
PLATELET # BLD AUTO: 126 10(3)UL (ref 150–450)
RBC # BLD AUTO: 3.91 X10(6)UL
WBC # BLD AUTO: 12 X10(3) UL (ref 4–11)

## 2024-03-10 PROCEDURE — 85025 COMPLETE CBC W/AUTO DIFF WBC: CPT | Performed by: OBSTETRICS & GYNECOLOGY

## 2024-03-10 NOTE — PLAN OF CARE
Problem: Diabetes/Glucose Control  Goal: Glucose maintained within prescribed range  Description: INTERVENTIONS:  - Monitor Blood Glucose as ordered  - Assess for signs and symptoms of hyperglycemia and hypoglycemia  - Administer ordered medications to maintain glucose within target range  - Assess barriers to adequate nutritional intake and initiate nutrition consult as needed  - Instruct patient on self management of diabetes  Outcome: Completed     Problem: POSTPARTUM  Goal: Long Term Goal:Experiences normal postpartum course  Description: INTERVENTIONS:  - Assess and monitor vital signs and lab values.  - Assess fundus and lochia.  - Provide ice/sitz baths for perineum discomfort.  - Monitor healing of incision/episiotomy/laceration, and assess for signs and symptoms of infection and hematoma.  - Assess bladder function and monitor for bladder distention.  - Provide/instruct/assist with pericare as needed.  - Provide VTE prophylaxis as needed.  - Monitor bowel function.  - Encourage ambulation and provide assistance as needed.  - Assess and monitor emotional status and provide social service/psych resources as needed.  - Utilize standard precautions and use personal protective equipment as indicated. Ensure aseptic care of all intravenous lines and invasive tubes/drains.  - Obtain immunization and exposure to communicable diseases history.  Outcome: Completed  Goal: Optimize infant feeding at the breast  Description: INTERVENTIONS:  - Initiate breast feeding within first hour after birth.   - Monitor effectiveness of current breast feeding efforts.  - Assess support systems available to mother/family.  - Identify cultural beliefs/practices regarding lactation, letdown techniques, maternal food preferences.  - Assess mother's knowledge and previous experience with breast feeding.  - Provide information as needed about early infant feeding cues (e.g., rooting, lip smacking, sucking fingers/hand) versus late cue  of crying.  - Discuss/demonstrate breast feeding aids (e.g., infant sling, nursing footstool/pillows, and breast pumps).  - Encourage mother/other family members to express feelings/concerns, and actively listen.  - Educate father/SO about benefits of breast feeding and how to manage common lactation challenges.  - Recommend avoidance of specific medications or substances incompatible with breast feeding.  - Assess and monitor for signs of nipple pain/trauma.  - Instruct and provide assistance with proper latch.  - Review techniques for milk expression (breast pumping) and storage of breast milk. Provide pumping equipment/supplies, instructions and assistance, as needed.  - Encourage rooming-in and breast feeding on demand.  - Encourage skin-to-skin contact.  - Provide LC support as needed.  - Assess for and manage engorgement.  - Provide breast feeding education handouts and information on community breast feeding support.   Outcome: Completed  Goal: Establishment of adequate milk supply with medication/procedure interruptions  Description: INTERVENTIONS:  - Review techniques for milk expression (breast pumping).   - Provide pumping equipment/supplies, instructions, and assistance until it is safe to breastfeed infant.  Outcome: Completed  Goal: Appropriate maternal -  bonding  Description: INTERVENTIONS:  - Assess caregiver- interactions.  - Assess caregiver's emotional status and coping mechanisms.  - Encourage caregiver to participate in  daily care.  - Assess support systems available to mother/family.  - Provide /case management support as needed.  Outcome: Completed

## 2024-03-10 NOTE — PROGRESS NOTES
Labor Analgesia Follow Up Note    Patient underwent epidural anesthesia for labor analgesia,    Placenta Date/Time: 3/9/2024  1:21 AM     Delivery Date/Time:: 3/9/2024   1:16 AM     /82 (BP Location: Left arm)   Pulse 78   Temp 98.3 °F (36.8 °C) (Oral)   Resp 16   LMP 06/06/2023 (Exact Date)   SpO2 95%   Breastfeeding Yes     Assessment:  Patient seen and no apparent anesthesia related complications.    Thank you for asking us to participate in the care of your patient.

## 2024-03-10 NOTE — PROGRESS NOTES
Discharge patient home as order. Teaching complete, patient feel comfortable in taking care of herself. Hugs and kisses off, send both mom and infant to their family car @ 1450.

## 2024-03-10 NOTE — PROGRESS NOTES
Postpartum Day 1    Pt without complaints.    Temp: 98.3 °F (36.8 °C)  Pulse: 78  Resp: 16  BP: 123/82  abd  soft, NT, ND, fundus firm below umbilicus  perineum NL lochia  extr  trace edema, no calf tenderness  Recent Labs   Lab 03/10/24  0708   RBC 3.91   HGB 10.6*   HCT 32.6*   MCV 83.4   MCH 27.1   MCHC 32.5   RDW 15.9   NEPRELIM 8.68*   WBC 12.0*   .0*       Impression: PPD#1  Plan:  Continue postpartum care.    Plan discharge home; instructions given.

## 2024-03-11 NOTE — PAYOR COMM NOTE
--------------  ADMISSION REVIEW     Payor: [employer]  Subscriber #:  [id number]  Authorization Number: [id number]    Admit date: 3/8/24  Admit time:  7:38 PM            24 0310 -- 103 -- 124/102 -- -- -- -- RS    24 0240 -- 100 -- 143/92 -- -- -- -- RS    24 0225 -- 95 -- 142/100 -- -- -- -- RS    24 0155 -- 103 -- 159/103 -- -- -- -- RS    24 0140 -- 96 -- 145/86 -- -- -- -- RS    24 0139 -- 102 -- 169/112 -- -- -- -- RS     OBGYN:  Patient comfortable with epidural now  AROM, clear fluid  SVE: 5/-1  Occasional variable deceleration noted, otherwise moderate variability  Will continue expectant management at this time  If no change in two hours, will plan for pitocin augmentation  All questions answered  Elevated BP noted prior to epidural and shortly after; PIH labs pending, P/C ration 0.21; patient without any s/s of pre-eclampsia at this time  Continue to monitor closely    3/9:    Vaginal Delivery Note      Delivery: Normal spontaneous vaginal delivery  Infant: female  Apgars: 9/9  Weight: 3550g     Brief history and labor course:  Ms. Elin Espitia is a 33 year old  at 39wk4d. She presented to labor and delivery for labor. Her pregnancy was complicated by A1GDM and prior  section x1. On exam in triage she was noted to be 3/80/-2. She was admitted for labor. She had AROM and progressed to complete dilation.      After pushing for 1 hour and 44 minutes, at 0116 patient delivered a viable female , wt pending, apgars of 9 (1 min) and 9 (5 min). The fetal vertex delivered spontaneously. Baby was checked for nuchal. Nuchal cord x1 was palpated and reduced. The anterior shoulder delivered atraumatically with maternal expulsive forces and the assistance of gentle downward traction. The posterior shoulder delivered with maternal expulsive forces and the assistance of upward traction. The remainder of the fetus delivered  spontaneously.        3/10:    DC home w/ infant.

## 2024-03-13 ENCOUNTER — TELEPHONE (OUTPATIENT)
Dept: OBGYN UNIT | Facility: HOSPITAL | Age: 34
End: 2024-03-13

## 2025-04-17 ENCOUNTER — OFFICE VISIT (OUTPATIENT)
Dept: FAMILY MEDICINE CLINIC | Facility: CLINIC | Age: 35
End: 2025-04-17
Payer: COMMERCIAL

## 2025-04-17 VITALS
RESPIRATION RATE: 16 BRPM | DIASTOLIC BLOOD PRESSURE: 74 MMHG | TEMPERATURE: 98 F | SYSTOLIC BLOOD PRESSURE: 120 MMHG | WEIGHT: 122 LBS | OXYGEN SATURATION: 98 % | BODY MASS INDEX: 20.33 KG/M2 | HEART RATE: 82 BPM | HEIGHT: 65 IN

## 2025-04-17 DIAGNOSIS — Z13.29 SCREENING FOR ENDOCRINE, NUTRITIONAL, METABOLIC AND IMMUNITY DISORDER: ICD-10-CM

## 2025-04-17 DIAGNOSIS — E78.00 PURE HYPERCHOLESTEROLEMIA: ICD-10-CM

## 2025-04-17 DIAGNOSIS — N63.12 MASS OF UPPER INNER QUADRANT OF RIGHT BREAST: ICD-10-CM

## 2025-04-17 DIAGNOSIS — Z00.00 ANNUAL PHYSICAL EXAM: Primary | ICD-10-CM

## 2025-04-17 DIAGNOSIS — Z98.890 HISTORY OF BREAST LUMP/MASS EXCISION: ICD-10-CM

## 2025-04-17 DIAGNOSIS — Z13.228 SCREENING FOR ENDOCRINE, NUTRITIONAL, METABOLIC AND IMMUNITY DISORDER: ICD-10-CM

## 2025-04-17 DIAGNOSIS — Z78.9 BREASTFEEDING (INFANT): ICD-10-CM

## 2025-04-17 DIAGNOSIS — L65.9 HAIR LOSS: ICD-10-CM

## 2025-04-17 DIAGNOSIS — Z13.21 SCREENING FOR ENDOCRINE, NUTRITIONAL, METABOLIC AND IMMUNITY DISORDER: ICD-10-CM

## 2025-04-17 DIAGNOSIS — Z13.0 SCREENING FOR ENDOCRINE, NUTRITIONAL, METABOLIC AND IMMUNITY DISORDER: ICD-10-CM

## 2025-04-17 DIAGNOSIS — Z86.32 HISTORY OF GESTATIONAL DIABETES MELLITUS (GDM): ICD-10-CM

## 2025-04-17 PROBLEM — Z34.90 PREGNANCY (HCC): Status: RESOLVED | Noted: 2024-03-08 | Resolved: 2025-04-17

## 2025-04-17 PROBLEM — R12 HEARTBURN: Status: RESOLVED | Noted: 2020-06-22 | Resolved: 2025-04-17

## 2025-04-17 PROBLEM — O24.419 GESTATIONAL DIABETES MELLITUS (GDM) AFFECTING PREGNANCY (HCC): Status: ACTIVE | Noted: 2023-12-20

## 2025-04-17 PROBLEM — Z31.9 PATIENT DESIRES PREGNANCY: Status: RESOLVED | Noted: 2023-01-17 | Resolved: 2025-04-17

## 2025-04-17 PROBLEM — D50.0 IRON DEFICIENCY ANEMIA SECONDARY TO BLOOD LOSS (CHRONIC): Status: RESOLVED | Noted: 2018-08-23 | Resolved: 2025-04-17

## 2025-04-17 PROBLEM — O24.419 GESTATIONAL DIABETES MELLITUS (GDM) AFFECTING PREGNANCY (HCC): Status: RESOLVED | Noted: 2023-12-20 | Resolved: 2025-04-17

## 2025-04-17 PROBLEM — T45.4X5D IRON ADVERSE REACTION, SUBSEQUENT ENCOUNTER: Status: RESOLVED | Noted: 2018-08-23 | Resolved: 2025-04-17

## 2025-04-17 PROBLEM — M94.0 COSTOCHONDRITIS: Status: RESOLVED | Noted: 2020-06-22 | Resolved: 2025-04-17

## 2025-04-17 PROCEDURE — 99395 PREV VISIT EST AGE 18-39: CPT | Performed by: FAMILY MEDICINE

## 2025-04-17 PROCEDURE — 99214 OFFICE O/P EST MOD 30 MIN: CPT | Performed by: FAMILY MEDICINE

## 2025-04-17 NOTE — PROGRESS NOTES
REASON FOR VISIT:    Elin Espitia is a 34 year old female who presents for an Annual Health Assessment.    History of Present Illness  Elin Espitia is a 34-year-old female who presents for an annual wellness visit.    She feels generally well and has started walking on the treadmill for exercise. Her one-year-old daughter keeps her active at home. She is currently taking a prenatal vitamin and consumes meat approximately twice a week. She previously experienced nausea when taking iron supplements with prenatal vitamin but was able to tolerate them after three months into her pregnancy. No known allergies to iron shots.    She reports a lump in the right breast, first noticed during her pregnancy around seven months gestation. The lump is located in the upper inner quadrant of the right breast and causes pain when breastfeeding, particularly when the breast is nearly empty. She had a fibroadenoma removed from the right breast in 2018 by Dr. Holley and is concerned about the recurrence of a similar mass. She is breastfeeding her daughter and plans to continue for another six months. Her menstrual periods are regular, and she uses condoms for birth control. She has no history of abnormal Pap smears, with the last Pap smear conducted in .    She reports increased hair thinning since her pregnancy, with no bald spots. Her father is bald, but her mother has no history of hair thinning. No recent COVID-19 infection, joint pain, rashes, losing eyelashes or eyebrows or fatigue. She had gestational diabetes controlled by diet, with a slightly elevated fasting glucose but normal A1c during pregnancy.         , monogamous- condoms only   age 9  daughter with development delay and 12-month-old daughter breast-feeding  menses: regular q 30 days x 3 days  Last pap: 2021- normal with negative HPV recall 5 years-declines Pap today  History of abnormal pap: denies  On vit D daily -not taking   MVI-  no,not taking any oral iron due to side effects  Calcium- not monitoring  Colonoscopy- never had  Mammogram- 2018 palpated right breast mass, had biopsy confirmed fibroadenoma then excision of right breast right upper outer quadrant 3 to 4 cm by  -had nodules in left breast and recommended one-time ultrasound for stability and if normal no further imaging recommended. Had biopsy of right breast and confirmed fibroadenoma on 6/5/2018.History of right breast fibroadenoma 3 to 4 cm status post lumpectomy Dr. Holley 8/14/2018   Exercise - tennis 1-2 day a week  Diet-meat, veg  Dentist regularly- yes  Annual eye exam- no no glasses  Etoh: denies  Cigs: never, no vaping  Immunizations: needs covid 19 new booster at local pharmacy, up-to-date on MMR  FH significant: Grandparents DM, no cancer in any family members  Family History   Problem Relation Age of Onset    No Known Problems Mother     No Known Problems Father     Diabetes Maternal Grandmother     No Known Problems Maternal Grandfather     No Known Problems Paternal Grandmother     Diabetes Paternal Grandfather     Stroke Paternal Grandfather     No Known Problems Daughter     Cancer Neg     Blood Disorder Neg          Patient Active Problem List   Diagnosis    Iron deficiency anemia secondary to blood loss (chronic)    Iron adverse reaction, subsequent encounter    Heartburn    Costochondritis    Fibroadenoma of right breast    Pure hypercholesterolemia    Patient desires pregnancy    Abnormal mammogram of left breast    Pregnancy (HCC)    Gestational diabetes mellitus (GDM) affecting pregnancy (HCC)     Current Outpatient Medications   Medication Sig Dispense Refill    prenatal vitamin with DHA 27-0.8-228 MG Oral Cap Take 1 capsule by mouth daily.      Ferrous Fumarate 325 (106 Fe) MG Oral Tab Take by mouth. (Patient not taking: Reported on 4/17/2025)       Wt Readings from Last 6 Encounters:   04/17/25 122 lb (55.3 kg)   03/08/24 152 lb (68.9 kg)    03/02/24 150 lb (68 kg)   02/15/24 149 lb (67.6 kg)   09/19/23 121 lb (54.9 kg)   01/17/23 112 lb 6.4 oz (51 kg)     Body mass index is 20.3 kg/m².    No results found for: \"GLUCOSE\"  Lab Results   Component Value Date    CHOLEST 154 01/18/2023    CHOLEST 177 04/22/2021    CHOLEST 143 06/15/2018     Lab Results   Component Value Date    HDL 45 (L) 01/18/2023    HDL 60 04/22/2021    HDL 49 06/15/2018     No results found for: \"TRIGLY\"  Lab Results   Component Value Date    LDL 94 01/18/2023     (H) 04/22/2021    LDL 84 06/15/2018     Lab Results   Component Value Date    AST 12 (L) 03/08/2024    AST 14 (L) 09/19/2023    AST 15 01/18/2023     Lab Results   Component Value Date    ALT 13 03/08/2024    ALT 18 09/19/2023    ALT 14 01/18/2023     Lab Results   Component Value Date    TSH 1.530 06/15/2018     Lab Results   Component Value Date    BUN 7 (L) 03/08/2024    BUN 9 09/19/2023    BUN 12 01/18/2023    CREATSERUM 0.48 (L) 03/08/2024    CREATSERUM 0.48 (L) 09/19/2023    CREATSERUM 0.71 01/18/2023       General Health     How would you describe your current health state?: Good    Type of Diet: Other    How do you maintain positive mental well-being?: Visiting Family, Visiting Friends, Games, Social Interaction, Puzzles    How would you describe your daily physical activity?: Moderate    If you are a male age 45-79 or a female age 55-79, do you take aspirin?: No    Have you had any immunizations at another office such as Influenza, Hepatitis B, Tetanus, or Pneumococcal?: No    At any time do you feel concerned for the safety/well-being of yourself and/or your children, in your home or elsewhere?: No     CAGE:     Cut: Have you ever felt you should Cut down on your drinking?: No    Annoyed: Have people Annoyed you by criticizing your drinking?: No    Guilty: Have you ever felt bad or Guilty about your drinking?: No    Eye Opener: Have you ever had a drink first thing in the morning to steady your nerves or to  get rid of a hangover (Eye opener)?: No    Scoring  Total Score: 0     Depression Screening (PHQ-2/PHQ-9): Over the LAST 2 WEEKS   Little interest or pleasure in doing things (over the last two weeks)?: Not at all    Feeling down, depressed, or hopeless (over the last two weeks)?: Not at all    PHQ-2 SCORE: 0        PREVENTATIVE SERVICES  INDICATIONS AND SCHEDULE Recommendation Internal Lab or Procedure External Lab or Procedure   Breast Cancer Screening   Every 2 yrs age 50-74 No recommendations at this time    Pap Every 3 yrs age 21-65 or Pap and HPV every 5 yrs age 30-65 Health Maintenance   Topic Date Due    Pap Smear  04/13/2024       Chlamydia Screening Screen Annually age<25, if sex active/on OCPs; >24 high risk No results found for: \"CHLAMYDIA\"    Colonoscopy Screen Every 10 years No recommendations at this time    Flex Sigmoidoscopy Screen  Every 5 years No results found for this or any previous visit.    Fecal Occult Blood  Annually No results found for: \"FOB\", \"OCCULTSTOOL\"    Obesity Screening Screen all adults annually Body mass index is 20.3 kg/m².      Preventive Services for Which Recommendations Vary with Risk Recommendation Internal Lab or Procedure External Lab or Procedure   Cholesterol Screening Recommended screening varies with age, risk and gender LDL-CHOLESTEROL (mg/dL (calc))   Date Value   01/18/2023 94       Diabetes Screening  if history of high blood pressure or other  risk factors No results found for: \"A1C\"  Glucose (mg/dL)   Date Value   03/08/2024 96   03/08/2024 93     GLUCOSE (mg/dL)   Date Value   01/18/2023 89         Gonorrhea Screening if high risk No results found for: \"GONOCOCCUS\"    HIV Screening For all adults age 18-65, older adults at increased risk No results found for: \"HIV\"    Syphilis Screening Screen if pregnant or high risk No results found for: \"RPR\"    Hepatitis C Screening Screen those at high risk plus screen one time for adults born 1946-1 965 No results found  for: \"HCVAB\"    Tuberculosis Screen if high risk No components found for: \"PPDINDURAT\"      Disease Monitoring:    SPECIFIC DISEASE MONITORING Internal Lab or Procedure External Lab or Procedure   Annual Monitoring of Persistent     Medications (ACE/ARB, digoxin, diuretics)    Potassium  Annually Potassium (mmol/L)   Date Value   03/08/2024 4.1     POTASSIUM (mmol/L)   Date Value   01/18/2023 4.4         No data to display                Creatinine  Annually CREATININE (mg/dL)   Date Value   01/18/2023 0.71     Creatinine (mg/dL)   Date Value   03/08/2024 0.48 (L)         No data to display                Digoxin Serum Conc  Annually No results found for: \"DIGOXIN\"      No data to display                Diabetes      HgbA1C  Annually No results found for: \"A1C\"      No data to display                Creat/alb ratio  Annually CREATININE (mg/dL)   Date Value   01/18/2023 0.71     Creatinine (mg/dL)   Date Value   03/08/2024 0.48 (L)        LDL  Annually LDL-CHOLESTEROL (mg/dL (calc))   Date Value   01/18/2023 94         No data to display                 Dilated Eye exam  Annually      No data to display                   No data to display                Asthma  (Annually between Nov. 1 & Dec. 31)    Date of last AAP/ACT and counseling given on importance of controller meds.                 ALLERGIES:   No Known Allergies    CURRENT MEDICATIONS:   Current Outpatient Medications   Medication Sig Dispense Refill    prenatal vitamin with DHA 27-0.8-228 MG Oral Cap Take 1 capsule by mouth daily.      Ferrous Fumarate 325 (106 Fe) MG Oral Tab Take by mouth. (Patient not taking: Reported on 4/17/2025)        MEDICAL INFORMATION:   Past Medical History:    Breast mass, right    Comments: Movable large right upper outer quadrant breast mass 6-7 cm diameter ×3and at 9:00 areolar border 2 cm palpable smooth mass movable mild tenderness.  Fibroadenoma, Consult: Leydi gerard.    Gestational diabetes (HCC)    History of lower  leg fracture    Iron deficiency anemia secondary to blood loss (chronic)      Past Surgical History:   Procedure Laterality Date    Appendectomy  2003      2014     delivery only  2014    Jabari localization wire 1 site right (cpt=19281)  '08    BENIGN      Family History   Problem Relation Age of Onset    No Known Problems Mother     No Known Problems Father     Diabetes Maternal Grandmother     No Known Problems Maternal Grandfather     No Known Problems Paternal Grandmother     Diabetes Paternal Grandfather     Stroke Paternal Grandfather     No Known Problems Daughter     Cancer Neg     Blood Disorder Neg       SOCIAL HISTORY:   Social History     Socioeconomic History    Marital status:    Tobacco Use    Smoking status: Never    Smokeless tobacco: Never   Vaping Use    Vaping status: Never Used   Substance and Sexual Activity    Alcohol use: No     Comment: No history of excessive use    Drug use: No   Other Topics Concern    Caffeine Concern Yes     Comment: 1 cup a day    Exercise Yes     Comment: Few times in a month    Seat Belt Yes    Special Diet No    Stress Concern No    Weight Concern No   Social History Narrative    .  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     Social Drivers of Health     Food Insecurity: No Food Insecurity (3/8/2024)    Food Insecurity     Food Insecurity: Never true   Transportation Needs: No Transportation Needs (3/8/2024)    Transportation Needs     Lack of Transportation: No   Stress: No Stress Concern Present (3/8/2024)    Stress     Feeling of Stress : No   Housing Stability: Low Risk  (3/8/2024)    Housing Stability     Housing Instability: No     Occ:     : yes       REVIEW OF SYSTEMS:   GENERAL: feels well otherwise  SKIN: denies any unusual skin lesions, hair loss diffuse thinning anterior parietal scalp times several months  EYES: denies blurred vision or double vision  HEENT: denies nasal congestion, sinus pain or ST  LUNGS: denies  shortness of breath with exertion  CARDIOVASCULAR: denies chest pain on exertion  GI: denies abdominal pain, denies heartburn  : denies dysuria, vaginal discharge or itching, periods regular   MUSCULOSKELETAL: denies back pain  NEURO: denies headaches  PSYCHE: denies depression or anxiety  HEMATOLOGIC:  hx of iron deficiency anemia  ENDOCRINE: denies thyroid history  ALL/ASTHMA: denies hx of allergy or asthma    EXAM:   /74 (BP Location: Left arm, Patient Position: Sitting, Cuff Size: adult)   Pulse 82   Temp 97.9 °F (36.6 °C) (Temporal)   Resp 16   Ht 5' 5\" (1.651 m)   Wt 122 lb (55.3 kg)   LMP 03/29/2025 (Approximate)   SpO2 98%   Breastfeeding Yes   BMI 20.30 kg/m²    Patient's last menstrual period was 03/29/2025 (approximate).   Physical Exam  Chest:          Comments: Red dot marks palpable movable 3 x 2 cm well-circumscribed mass in the upper inner quadrant but in the inferior portion of the quadrant.  Breasts are otherwise normal with no other palpable masses axilla is clear bilateral.    GENERAL: well developed, well nourished, in no apparent distress  SKIN: no rashes, no suspicious lesions, anterior parietal area of the scalp has diffuse thinning of hair but no bald spots.  Still has eyelashes and eyebrows.  Scalp is healthy.  Hair is pulled back in a ponytail  HEENT: atraumatic, normocephalic, ears and throat are clear, removed mask  EYES:PERRLA, EOMI, normal optic disk, conjunctiva are clear  NECK: supple, no adenopathy, no bruits  CHEST: no chest tenderness  BREAST: no dominant or suspicious mass bilateral, axilla clear bilateral, normal breast contour bilateral  LUNGS: clear to auscultation  CARDIO: RRR without murmur  GI: good BS's, no masses, HSM or tenderness  : introitus is normal, scant discharge, cervix is pink, no adnexal masses or tenderness,  RECTAL: good rectal tone  MUSCULOSKELETAL: back is not tender, FROM of the back  EXTREMITIES: no cyanosis, clubbing or edema  NEURO:  Oriented times three, cranial nerves are intact, motor and sensory are grossly intact    ASSESSMENT AND OTHER RELEVANT CHRONIC CONDITIONS:   Elin Espitia is a 34 year old female who presents for an Annual Health Assessment.     PLAN SUMMARY:   1. Annual physical exam  -Encourage Mediterranean diet  -Encouraged exercise 30 minutes to 60 minutes 3-5 times weekly for 150-300 minutes to prevent obesity and chronic disease and eliminate stress and its effect on the body.  -encouraged to continue not smoking  -safe sex practices - recommend condom use  -immunizations-recommend annual flu shot in the fall, needs new COVID-19 booster  -Vitamin D3  2000 units daily recommended  -Needs 1000 mg of calcium daily for osteoporosis prevention discussed  -thin prep pap recommended every 3 years due 2024  - CBC With Differential With Platelet  - Comp Metabolic Panel  - Lipid Panel  - TSH W Reflex To Free T4    2. Pure hypercholesterolemia  - Lipid Panel  encourage mediterranean diet  Exercise brisk walk 30-60 mins at least 5 days weekly  Lose weight if overweight  Recheck fasting labs annually if controlled    3. History of gestational diabetes mellitus (GDM)  - HGB A1C [496] [Q]    4. Mass of upper inner quadrant of right breast  - Surg Onc Breast Referral - In Network  - [facility] JIM 2D+3D DIAGNOSTIC AMIRAH RIGHT (CPT=77065/81920); Future  - US BREAST RIGHT COMPLETE (CPT=76641); Future  Patient is to complete imaging diagnostic mammogram and ultrasound of breast  Then schedule follow-up with Dr. Holley to discuss lumpectomy given size  Suspect fibroadenoma    5. History of breast lump/mass excision  - Surg Onc Breast Referral - In Network  - [facility] JIM 2D+3D DIAGNOSTIC AMIRAH RIGHT (CPT=77065/15222); Future  - US BREAST RIGHT COMPLETE (CPT=76641); Future  Prior excisional breast mass right breast 2018    6. Hair loss  - TSH W Reflex To Free T4  - SED RATE, WESTERGREN [809] [Q]  - FERRITIN [457] [Q]  - IRON AND TIBC [7573] [Q]  -  VITAMIN D, 25-HYDROXY [03711][Q]  - Derm Referral - In Network  No joint pain or rashes.  Will rule out vitamin deficiency anemia, thyroid disease autoimmune with sed rate but no other symptoms.  Patient may start vivascal shampoo and conditioner  Stop pulling hair in ponytail traction can cause damage to the roots and affect hair growth  No history of recent COVID-19 infection or illness or stress  Possible female pattern baldness?  Versus vitamin deficiency versus thyroid disease excetra    7. Breastfeeding (infant)  - Surg Onc Breast Referral - In Network  - [facility] JIM 2D+3D DIAGNOSTIC AMIRAH RIGHT (CPT=77065/81471); Future    8. BMI 20.0-20.9, adult    9. Screening for endocrine, nutritional, metabolic and immunity disorder  - Comp Metabolic Panel  - TSH W Reflex To Free T4      The patient indicates understanding of these issues and agrees to the plan.  Return in about 1 year (around 4/17/2026) for annual physical, fasting labs AM, sooner if needed..    Diet counseling perfomed  Exercise counseling perfomed   Contraception counseling with folic acid 400-800 mcg minimum daily     SUGGESTED VACCINATIONS - Influenza, Pneumococcal, Zoster, Tetanus     Immunization History   Administered Date(s) Administered    Covid-19 Vaccine Pfizer 30 mcg/0.3 ml 05/17/2021, 06/07/2021    FLULAVAL 6 months & older 0.5 ml Prefilled syringe (96117) 10/12/2018    Influenza 02/01/2024    MMR 06/25/2021, 03/24/2023    TDAP 06/15/2018, 01/19/2024    Tb Intradermal Test 06/18/2021, 03/24/2023    Varicella Deferred (Had Chicken Pox) 11/07/2001       Influenza Annually   Pneumococcal if high risk   Td/Tdap once then every 10 years   HPV Females 11-26: 3 doses   Zoster (Shingles) 60 and older: one dose   Varicella 2 doses if not immune   MMR 1-2 doses if born after 1957 and not immune

## 2025-04-17 NOTE — PATIENT INSTRUCTIONS
Perform labs fasting 8 hours with water or black coffee or or black tea diet  soda only prior to exam.    -Encourage healthy diet of whole food and avoid processed food and sugary drinks and sodas.  Diet should include lean meats and vegetables including 5-7 servings of fruit and vegetables total in 1 day.  Never skip breakfast.  -Encouraged exercise 30 minutes to 60 minutes 3-5 times weekly for 150minutes or more to prevent obesity and chronic disease and eliminate stress and its effect on the body.  -encouraged to continue not smoking or vaping  - recommend condom use per CDC recommendation for all  or unmarried couples  -mammogram order given if 40years old or older  - immunizations-annual flu shot recommended  -Vitamin D3  2000 units daily recommended- buy Over-the-counter  -Recommend 1000mg of calcium daily for osteoporosis prevention discussed. Need to ingest 1000mg of calcium daily to prevent osteoporosis later in life.  I.e. one 8 ounce glass of silk [place] milk has 450 mg of calcium and label states 45%.  Labels list calcium percentages not milligrams.  To calculate milligrams per serving remove the percentage and add a zero (0).  I.e. 9% calcium equals 90 mg  -thin prep pap recommended every 3 years-If previous pap was normal  sooner as directed by your doctor.    Exercise for a Healthier Heart     Exercise with a friend. When activity is fun, you're more likely to stick with it.   You may wonder how you can improve the health of your heart. If you’re thinking about exercise, you’re on the right track. You don’t need to become an athlete, but you do need a certain amount of brisk exercise to help strengthen your heart. If you have been diagnosed with a heart condition, your doctor may recommend exercise to help stabilize your condition. To help make exercise a habit, choose safe, fun activities.  Be sure to check with your healthcare provider before starting an exercise program.   Why  exercise?  Exercising regularly offers many healthy rewards. It can help you do all of the following:  Improve your blood cholesterol level to help prevent further heart trouble  Lower your blood pressure to help prevent a stroke or heart attack  Control diabetes, or reduce your risk of getting this disease  Improve your heart and lung function  Reach and maintain a healthy weight  Make your muscles stronger and more limber so you can stay active  Prevent falls and fractures by slowing the loss of bone mass (osteoporosis)  Manage stress better  Reduce your blood pressure  Improve your sense of self and your body image  Exercise tips  Ease into your routine. Set small goals. Then build on them.  Exercise on most days. Aim for a total of 150 or more minutes of moderate to  vigorous intensity activity each week. Consider 40 minutes, 3 to 4 times a week. For best results, activity should last for 40 minutes on average. It is OK to work up to the 40 minute period over time. Examples of moderate-intensity activity is walking 1 mile in 15 minutes or 30 to 45 minutes of yard work.  Step up your daily activity level. Along with your exercise program, try being more active throughout the day. Walk instead of drive. Do more household tasks or yard work.  Choose one or more activities you enjoy. Walking is one of the easiest things you can do. You can also try swimming, riding a bike, dancing, or taking an exercise class.  Stop exercising and call your doctor if you:  Have chest pain or feel dizzy or lightheaded  Feel burning, tightness, pressure, or heaviness in your chest, neck, shoulders, back, or arms  Have unusual shortness of breath  Have increased joint or muscle pain  Have palpitations or an irregular heartbeat   Date Last Reviewed: 5/1/2016 © 2000-2018 [employer]. [address] [place] [id number]. All rights reserved. This information is not intended as a substitute for professional medical  care. Always follow your healthcare professional's instructions.      Eating Heart-Healthy Foods  Eating has a big impact on your heart health. In fact, eating healthier can improve several of your heart risks at once. For instance, it helps you manage weight, cholesterol, and blood pressure. Here are ideas to help you make heart-healthy changes without giving up all the foods and flavors you love.  Getting started  Talk with your healthcare provider about eating plans, such as the DASH or Mediterranean diet. You may also be referred to a dietitian.  Change a few things at a time. Give yourself time to get used to a few eating changes before adding more.  Work to create a tasty, healthy eating plan that you can stick to for the rest of your life.    Goals for healthy eating  Below are some tips to improve your eating habits:  Limit saturated fats and trans fats. Saturated fats raise your levels of cholesterol, so keep these fats to a minimum. They are found in foods such as fatty meats, whole milk, cheese, and palm and coconut oils. Avoid trans fats because they lower good cholesterol as well as raise bad cholesterol. Trans fats are most often found in processed foods.  Reduce sodium (salt) intake. Eating too much salt may increase your blood pressure. Limit your sodium intake to 2,300 milligrams (mg) per day (the amount in 1 teaspoon of salt), or less if your healthcare provider recommends it. Dining out less often and eating fewer processed foods are two great ways to decrease the amount of salt you consume.  Managing calories. A calorie is a unit of energy. Your body burns calories for fuel, but if you eat more calories than your body burns, the extras are stored as fat. Your healthcare provider can help you create a diet plan to manage your calories. This will likely include eating healthier foods as well as exercising regularly. To help you track your progress, keep a diary to record what you eat and how often  you exercise.  Choose the right foods  Aim to make these foods staples of your diet. If you have diabetes, you may have different recommendations than what is listed here:  Fruits and vegetables provide plenty of nutrients without a lot of calories. At meals, fill half your plate with these foods. Split the other half of your plate between whole grains and lean protein.  Whole grains are high in fiber and rich in vitamins and nutrients. Good choices include whole-wheat bread, pasta, and brown rice.  Lean proteins give you nutrition with less fat. Good choices include fish, skinless chicken, and beans.  Low-fat or nonfat dairy provides nutrients without a lot of fat. Try low-fat or nonfat milk, cheese, or yogurt.  Healthy fats can be good for you in small amounts. These are unsaturated fats, such as olive oil, nuts, and fish. Try to have at least 2 servings per week of fatty fish, such as salmon, sardines, mackerel, rainbow trout, and albacore tuna. These contain omega-3 fatty acids, which are good for your heart. Flaxseed is another source of a heart-healthy fat.  More on heart-healthy eating  Read food labels  Healthy eating starts at the grocery store. Be sure to pay attention to food labels on packaged foods. Look for products that are high in fiber and protein, and low in saturated fat, cholesterol, and sodium. Avoid products that contain trans fat. And pay close attention to serving size. For instance, if you plan to eat two servings, double all the numbers on the label.  Prepare food right  A key part of healthy cooking is cutting down on added fat and salt. Look on the internet for lower-fat, lower-sodium recipes. Also, try these tips:  Remove fat from meat and skin from poultry before cooking.  Skim fat from the surface of soups and sauces.  Broil, boil, bake, steam, grill, and microwave food without added fats.  Choose ingredients that spice up your food without adding calories, fat, or sodium. Try these  items: horseradish, hot sauce, lemon, mustard, nonfat salad dressings, and vinegar. For salt-free herbs and spices, try basil, cilantro, cinnamon, pepper, and rosemary.  Date Last Reviewed: 10/1/2017  © 3844-8002 [employer]. [address] [place] [address]. All rights reserved. This information is not intended as a substitute for professional medical care. Always follow your healthcare professional's instructions.       What Is Osteoporosis?  Osteoporosis is a disease that weakens the bones. Weakened bones are more likely to break (fracture). Osteoporosis affects both men and women. But postmenopausal women are most at risk. To help prevent osteoporosis, you need to exercise and nourish your bones throughout your life.    Childhood  The body builds the most bone during these years. That's why boys and girls need foods rich in calcium. They also need plenty of exercise. A healthy diet and exercise helps bones grow strong.  Young adulthood to age 30  During young adulthood, bones become their strongest. This is called peak bone mass. The same good habits that kept bones healthy in childhood help keep bones healthy in adulthood.  Age 30 to menopause  Bone mass declines slightly during these years. Your body makes just enough new bone to maintain peak bone mass. To keep your bones at their peak mass, be sure to exercise and get plenty of calcium.  After menopause  Menopause is when a woman stops having monthly periods. After menopause, the body makes less estrogen (female hormone). This increases bone loss. At this point, treatment may be needed to reduce the risk for fracture. Exercise and calcium can also help keep your bones strong.  Later in life  In later years, both men and women need to take extra care of their bones. By this point, the body loses more bone than it makes. If too much bone is lost, you may be at increased risk for fractures. With age, the quality and quantity of bone  declines. You can lessen bone loss by staying active and increasing your calcium intake. Calcium supplements and other osteoporosis treatments do have risks. So talk with your healthcare provider if you have concerns. If you have osteoporosis, you can also learn ways to increase everyday safety.  Date Last Reviewed: 5/1/2018  © 3927-7043 [employer]. [address] [place] [address], [place] [address]. All rights reserved. This information is not intended as a substitute for professional medical care. Always follow your healthcare professional's instructions.          Preventing Osteoporosis: Meeting Your Calcium Needs    Your body needs calcium to build and repair bones. But it can't make calcium on its own. That's why it's important to eat calcium-rich foods. Some foods are naturally rich in calcium. Others have calcium added (fortified). It's best to get calcium from the foods you eat. But if you can't get enough, you may want to take calcium supplements. To meet your daily calcium needs, try the foods listed below.               Dairy Fish & beans Other sources   Source   Calcium (mg) per serving   Source   Calcium (mg) per serving   Source   Calcium (mg) per serving   Low-fat yogurt, plain   415 mg/8 oz.   Sardines, [place], canned, with bones   351 mg/3 oz.   Oatmeal, instant, fortified   215 mg/1 cup   Nonfat milk   302 mg/1 cup   [place], sockeye, canned, with bones   239 mg/3 oz.   Tofu made with calcium sulfate   204 mg/3 oz.   Low-fat milk   297 mg/1 cup   Soybeans, fresh, boiled   131 mg/1/2 cup   Collards   179 mg/1/2 cup   Swiss cheese   272 mg/1 oz.   White beans, cooked   81 mg/1/2 cup   English muffin, whole wheat   175 mg/1 muffin   Cheddar cheese   205 mg/1 oz.   Navy beans, cooked   79 mg/1/2 cup   Kale   90 mg/1/2 cup   Ice cream strawberry   79 mg/1/2 cup           Orange, navel   56 mg/1 medium   Note: Calcium levels may vary depending on brand and size.  Daily calcium needs  14 to 18  years old: 1,300 mg  19 to 30 years old: 1,000 mg  31 to 50 years old: 1,000 mg  51 to 70 years old, women: 1,200 mg  51 to 70 years old, men: 1,000 mg  Pregnant or nursin to 18 years old: 1,300 mg, 19 to 50 years old: 1,000 mg  Older than 70 (women and men): 1,200 mg   Date Last Reviewed: 2018-2018 [employer]. [address] [place] [address]. All rights reserved. This information is not intended as a substitute for professional medical care. Always follow your healthcare professional's instructions.          Vitamin D  Does this test have other names?  25-hydroxyvitamin D (25-high-DROX-ee-VIE-tuh-min D), 25(OH)D  What is this test?  Vitamin D is mainly found in fortified dairy foods, juice, breakfast cereal, and certain fish. This vitamin plays many roles in the body. But because it helps the body absorb calcium from foods and supplements, it's particularly important for bone health. Vitamin D has many additional roles in the body.  Vitamin D comes in several forms. When ultraviolet light, such as sunlight, hits your skin, it creates vitamin D3. D2 is used to fortify dairy foods. Both of these are further processed by your liver and kidneys into a form your body can use. Most tests for vitamin D check the level of a form circulating in the body called 25-hydroxyvitamin D, also called 25(OH)D.   Why do I need this test?  You may need this test if your healthcare provider wants to check your vitamin D levels to find out if you have any risks to bone health. These might be:  Low calcium  Soft bones caused by low vitamin D or problems using it (osteomalacia)  Osteopenia  Osteoporosis  Rickets, in children  You may also need this test if you are at risk for low vitamin D levels. Risks include:  Being an older adult  Having difficulty absorbing fat from your diet  Having chronic kidney disease  Have dark skin pigmentation  Being a  baby  Vitamin D has many effects in the  body. You may need this test to help your healthcare provider diagnose or treat:  Problems with the parathyroid gland  Cancer  Autoimmune diseases, such as multiple sclerosis and Crohn's disease  Psoriasis  Asthma  Weakness or falls    What other tests might I have along with this test?  A healthcare provider may also want to check your parathyroid hormone levels and your calcium levels.   What do my test results mean?  Test results may vary depending on your age, gender, health history, the method used for the test, and other things. Your test results may not mean you have a problem. Ask your healthcare provider what your test results mean for you.   Children and adults need more than 30 nanograms per milliliter (ng/ml) of vitamin D. The optimal level of 25(OH)D is usually between 30 and 60 ng/mL. Recommended daily amounts range from 400 to 800 international units (IU) per day based on your age.  Levels lower than normal can mean you are:  Not making enough vitamin D on your own  Not getting enough vitamin D in your diet  Not absorbing vitamin D from your food as you should  Lower levels may also mean that your body is not converting the vitamin as it should. This might be because of kidney or liver disease.  Above-normal levels may be a sign that you're taking too much in supplement form.   How is this test done?  The test is done with a blood sample. A needle is used to draw blood from a vein in your arm or hand.   Does this test pose any risks?  Having a blood test with a needle carries some risks. These include bleeding, infection, bruising, and feeling lightheaded. When the needle pricks your arm or hand, you may feel a slight sting or pain. Afterward, the site may be sore.   What might affect my test results?  The amount of time you spend in the sunlight, your diet, and whether you take vitamin D in supplement form can affect your vitamin D levels. Ask your healthcare provider if any health conditions you  have or medicines you take could affect your results.  How do I get ready for this test?  Tell your healthcare provider if you take vitamin D supplements. Be sure your healthcare provider knows about all medicines, herbs, vitamins, and supplements you are taking. This includes medicines that don't need a prescription and any illicit drugs you may use.   © 4508-0531 [employer]. [address] [place] [address]. All rights reserved. This information is not intended as a substitute for professional medical care. Always follow your healthcare professional's instructions.          Preventing Osteoporosis: Avoiding Bone Loss  Certain factors can speed up bone loss or decrease bone growth. For example, alcohol, cigarettes, and certain medicines reduce bone mass. Some foods make it hard for your body to absorb calcium.    Things to avoid  Here are things to avoid to help prevent osteoporosis:  Alcohol. This is toxic to bones. It is a major cause of bone loss. Heavy drinking can cause osteoporosis even if you have no other risk factors.  Smoking. This reduces bone mass. Smoking may also interfere with estrogen levels and cause early menopause.  Inactivity. Not being active makes your bones lose strength and become thinner. Over time, thin bones may break. Women who aren't active are at a high risk for osteoporosis.  Certain medicines. Some medicines, such as cortisone, increase bone loss. They also decrease bone growth. Ask your healthcare provider about any side effects of your medicines, and how to prevent them.  Protein-rich or salty foods. Eaten in large amounts, these foods may deplete calcium.  Caffeine. This increases calcium loss. People who drink a lot of coffee, tea, or soda lose more calcium than those who don't.  Date Last Reviewed: 5/1/2018  © 5043-4495 [employer]. [address] [place] [address]. All rights reserved. This information is not intended as a  substitute for professional medical care. Always follow your healthcare professional's instructions.          Living with Osteoporosis: Regular Exercise  If you have osteoporosis, exercise is vital for your health. It can prevent bone fractures and spine changes. It will slow bone loss. Exercise will strengthen your body. It can also be fun. A variety of exercises is best. See below for exercises that can help you. But before you start, talk with your healthcare provider to be sure these exercises are right for you.    Resistance exercises. These build muscle strength and maintain bone mass. They also make you less prone to injury. Exercises include lifting small weights, doing push-ups and sit-ups, using elastic exercise bands, and using weight machines.  Weight-bearing activities. These help your whole body. They also help you maintain bone mass. Activities include walking, dancing, and housework.  Non-weight-bearing exercises. These help prevent back strain and pain. They do this by building the trunk and leg muscles. Exercises that help with flexibility can prevent falls. Examples include swimming, water exercise, and stretching.  Staying safe  Here are tips to stay safe:   Always check with your healthcare provider before starting any new exercise program.  Use weights only as instructed.  Stop any exercise that causes pain.   Date Last Reviewed: 5/1/2018  © 3638-0605 [employer]. [address] [place] [address], [place] [address]. All rights reserved. This information is not intended as a substitute for professional medical care. Always follow your healthcare professional's instructions.

## 2025-05-20 LAB
% SATURATION: 26 % (CALC) (ref 16–45)
ABSOLUTE BASOPHILS: 50 CELLS/UL (ref 0–200)
ABSOLUTE EOSINOPHILS: 170 CELLS/UL (ref 15–500)
ABSOLUTE LYMPHOCYTES: 1638 CELLS/UL (ref 850–3900)
ABSOLUTE MONOCYTES: 353 CELLS/UL (ref 200–950)
ABSOLUTE NEUTROPHILS: 4089 CELLS/UL (ref 1500–7800)
ALBUMIN/GLOBULIN RATIO: 1.7 (CALC) (ref 1–2.5)
ALBUMIN: 4.4 G/DL (ref 3.6–5.1)
ALKALINE PHOSPHATASE: 39 U/L (ref 31–125)
ALT: 11 U/L (ref 6–29)
AST: 12 U/L (ref 10–30)
BASOPHILS: 0.8 %
BILIRUBIN, TOTAL: 0.4 MG/DL (ref 0.2–1.2)
BUN: 15 MG/DL (ref 7–25)
CALCIUM: 9.7 MG/DL (ref 8.6–10.2)
CARBON DIOXIDE: 24 MMOL/L (ref 20–32)
CHLORIDE: 106 MMOL/L (ref 98–110)
CREATININE: 0.71 MG/DL (ref 0.5–0.97)
EGFR: 114 ML/MIN/1.73M2
EOSINOPHILS: 2.7 %
FERRITIN: 17 NG/ML (ref 16–154)
GLOBULIN: 2.6 G/DL (CALC) (ref 1.9–3.7)
GLUCOSE: 98 MG/DL (ref 65–139)
HEMATOCRIT: 39.5 % (ref 35–45)
HEMOGLOBIN A1C: 5.3 %
HEMOGLOBIN: 13 G/DL (ref 11.7–15.5)
IRON BINDING CAPACITY: 393 MCG/DL (CALC) (ref 250–450)
IRON, TOTAL: 102 MCG/DL (ref 40–190)
LYMPHOCYTES: 26 %
MCH: 27.4 PG (ref 27–33)
MCHC: 32.9 G/DL (ref 32–36)
MCV: 83.3 FL (ref 80–100)
MONOCYTES: 5.6 %
MPV: 11 FL (ref 7.5–12.5)
NEUTROPHILS: 64.9 %
PLATELET COUNT: 243 THOUSAND/UL (ref 140–400)
POTASSIUM: 4.2 MMOL/L (ref 3.5–5.3)
PROTEIN, TOTAL: 7 G/DL (ref 6.1–8.1)
RDW: 12.9 % (ref 11–15)
RED BLOOD CELL COUNT: 4.74 MILLION/UL (ref 3.8–5.1)
SED RATE BY MODIFIED$WESTERGREN: 2 MM/H
SODIUM: 139 MMOL/L (ref 135–146)
TSH W/REFLEX TO FT4: 0.88 MIU/L
VITAMIN D, 25-OH, TOTAL: 34 NG/ML (ref 30–100)
WHITE BLOOD CELL COUNT: 6.3 THOUSAND/UL (ref 3.8–10.8)

## (undated) DEVICE — HEMOCLIP HORIZON MED 002200

## (undated) DEVICE — DRAPE PACK CHEST & U BAR

## (undated) DEVICE — CAUTERY BLADE 2IN INS E1455

## (undated) DEVICE — SUTURE MONOCRYL 4-0 PS-2

## (undated) DEVICE — HEMOCLIP HORIZON SM 001200

## (undated) DEVICE — SOL  .9 1000ML BTL

## (undated) DEVICE — DRAPE,TAPE STRIPS,STERILE: Brand: MEDLINE

## (undated) DEVICE — KENDALL SCD EXPRESS SLEEVES, KNEE LENGTH, MEDIUM: Brand: KENDALL SCD

## (undated) DEVICE — TOWEL: OR BLU 80/CS: Brand: MEDICAL ACTION INDUSTRIES

## (undated) DEVICE — DRAPE HALF 40X58 DYNJP2410

## (undated) DEVICE — SUTURE VICRYL 3-0 SH

## (undated) DEVICE — GLOVE SURG TRIUMPH SZ 6-1/2

## (undated) DEVICE — GOWN,SIRUS,FABRIC-REINFORCED,LARGE: Brand: MEDLINE

## (undated) DEVICE — BRA ZIPPERED STYLE 958 LARGE

## (undated) DEVICE — BREAST-HERNIA-PORT CDS-LF: Brand: MEDLINE INDUSTRIES, INC.

## (undated) NOTE — LETTER
Dear New MomKenia, we missed you! The nurses of [facility] have tried to reach you by phone to ask if you have any questions regarding your health or the health and care of your new little one.    We hope you are doing well. If, for any reason, you have questions or concerns about your health or your baby’s health, please contact your provider or your pediatrician or family medicine physician regarding your baby.     At [facility], we feel that postpartum support is very important for new families. Please see the enclosed new parent support flyer that lists support programs and resources with both in-person and online options.     Additionally, our Breastfeeding Centers at [facility] and [facility] in [place], offer outpatient visits with our International Board-Certified Lactation   Consultants (IBCLCs) for any breastfeeding concerns or questions you may have.    For issues related to stress, anxiety or depression, we have a Nurturing Mom support group that meets both in-person or online.  There’s also a 24-hour Mom’s Line where you can request a phone call from a clinical therapist for assistance for postpartum depression.    We encourage you to take advantage of these programs and resources as you recover from childbirth and learn to care for your new infant.    Best wishes,    [facility]            [phone]

## (undated) NOTE — LETTER
Date & Time: 9/16/2021, 7:40 PM  Patient: Bertie Moritz      To Whom It May Concern:    Bertie Moritz was seen and treated in our department on 9/16/2021. Patient is negative for Covid. Patient may return to work tomorrow and is fever free.

## (undated) NOTE — LETTER
07/09/21        [address]renda [address]      Dear Ellis Rae,    [facility] records indicate that you have outstanding lab work and or testing that was ordered for you and has not yet been completed      CBC (complete blood count)  To

## (undated) NOTE — LETTER
11/24/20        Jaime [address]      Dear Loly Cruz,    [facility] records indicate that you have outstanding lab work and or testing that was ordered for you and has not yet been completed:  Orders Placed This Encounter

## (undated) NOTE — LETTER
Date & Time: 10/26/2021, 6:19 PM  Patient: Analia Eli  Encounter Provider(s):    French Gastelum PA-C       To Whom It May Concern:    Analia Eli was seen and treated in our department on 10/26/2021.  She should not return to work until